# Patient Record
Sex: MALE | Race: OTHER | URBAN - METROPOLITAN AREA
[De-identification: names, ages, dates, MRNs, and addresses within clinical notes are randomized per-mention and may not be internally consistent; named-entity substitution may affect disease eponyms.]

---

## 2020-06-04 ENCOUNTER — INPATIENT (INPATIENT)
Facility: HOSPITAL | Age: 60
LOS: 0 days | Discharge: LEFT BEFORE DISCHARGE | End: 2020-06-05
Attending: INTERNAL MEDICINE | Admitting: INTERNAL MEDICINE
Payer: MEDICAID

## 2020-06-04 VITALS
RESPIRATION RATE: 20 BRPM | WEIGHT: 214.07 LBS | TEMPERATURE: 99 F | HEART RATE: 115 BPM | OXYGEN SATURATION: 97 % | DIASTOLIC BLOOD PRESSURE: 89 MMHG | SYSTOLIC BLOOD PRESSURE: 132 MMHG

## 2020-06-04 DIAGNOSIS — Z87.81 PERSONAL HISTORY OF (HEALED) TRAUMATIC FRACTURE: Chronic | ICD-10-CM

## 2020-06-04 DIAGNOSIS — S42.401A UNSPECIFIED FRACTURE OF LOWER END OF RIGHT HUMERUS, INITIAL ENCOUNTER FOR CLOSED FRACTURE: Chronic | ICD-10-CM

## 2020-06-04 DIAGNOSIS — Z98.890 OTHER SPECIFIED POSTPROCEDURAL STATES: Chronic | ICD-10-CM

## 2020-06-04 LAB
ALBUMIN SERPL ELPH-MCNC: 3.8 G/DL — SIGNIFICANT CHANGE UP (ref 3.5–5.2)
ALP SERPL-CCNC: 135 U/L — HIGH (ref 30–115)
ALT FLD-CCNC: 48 U/L — HIGH (ref 0–41)
ANION GAP SERPL CALC-SCNC: 14 MMOL/L — SIGNIFICANT CHANGE UP (ref 7–14)
APPEARANCE UR: CLEAR — SIGNIFICANT CHANGE UP
AST SERPL-CCNC: 45 U/L — HIGH (ref 0–41)
BASOPHILS # BLD AUTO: 0.05 K/UL — SIGNIFICANT CHANGE UP (ref 0–0.2)
BASOPHILS NFR BLD AUTO: 0.5 % — SIGNIFICANT CHANGE UP (ref 0–1)
BILIRUB SERPL-MCNC: 0.8 MG/DL — SIGNIFICANT CHANGE UP (ref 0.2–1.2)
BILIRUB UR-MCNC: NEGATIVE — SIGNIFICANT CHANGE UP
BUN SERPL-MCNC: 8 MG/DL — LOW (ref 10–20)
CALCIUM SERPL-MCNC: 8.8 MG/DL — SIGNIFICANT CHANGE UP (ref 8.5–10.1)
CHLORIDE SERPL-SCNC: 98 MMOL/L — SIGNIFICANT CHANGE UP (ref 98–110)
CO2 SERPL-SCNC: 25 MMOL/L — SIGNIFICANT CHANGE UP (ref 17–32)
COLOR SPEC: SIGNIFICANT CHANGE UP
CREAT SERPL-MCNC: 0.9 MG/DL — SIGNIFICANT CHANGE UP (ref 0.7–1.5)
DIFF PNL FLD: SIGNIFICANT CHANGE UP
EOSINOPHIL # BLD AUTO: 0.08 K/UL — SIGNIFICANT CHANGE UP (ref 0–0.7)
EOSINOPHIL NFR BLD AUTO: 0.8 % — SIGNIFICANT CHANGE UP (ref 0–8)
ERYTHROCYTE [SEDIMENTATION RATE] IN BLOOD: 45 MM/HR — HIGH (ref 0–10)
GLUCOSE SERPL-MCNC: 93 MG/DL — SIGNIFICANT CHANGE UP (ref 70–99)
GLUCOSE UR QL: NEGATIVE — SIGNIFICANT CHANGE UP
HCT VFR BLD CALC: 45 % — SIGNIFICANT CHANGE UP (ref 42–52)
HGB BLD-MCNC: 14.9 G/DL — SIGNIFICANT CHANGE UP (ref 14–18)
IMM GRANULOCYTES NFR BLD AUTO: 0.3 % — SIGNIFICANT CHANGE UP (ref 0.1–0.3)
KETONES UR-MCNC: ABNORMAL
LEUKOCYTE ESTERASE UR-ACNC: NEGATIVE — SIGNIFICANT CHANGE UP
LIDOCAIN IGE QN: 18 U/L — SIGNIFICANT CHANGE UP (ref 7–60)
LYMPHOCYTES # BLD AUTO: 1.85 K/UL — SIGNIFICANT CHANGE UP (ref 1.2–3.4)
LYMPHOCYTES # BLD AUTO: 19.5 % — LOW (ref 20.5–51.1)
MCHC RBC-ENTMCNC: 30.2 PG — SIGNIFICANT CHANGE UP (ref 27–31)
MCHC RBC-ENTMCNC: 33.1 G/DL — SIGNIFICANT CHANGE UP (ref 32–37)
MCV RBC AUTO: 91.1 FL — SIGNIFICANT CHANGE UP (ref 80–94)
MONOCYTES # BLD AUTO: 1.22 K/UL — HIGH (ref 0.1–0.6)
MONOCYTES NFR BLD AUTO: 12.8 % — HIGH (ref 1.7–9.3)
NEUTROPHILS # BLD AUTO: 6.28 K/UL — SIGNIFICANT CHANGE UP (ref 1.4–6.5)
NEUTROPHILS NFR BLD AUTO: 66.1 % — SIGNIFICANT CHANGE UP (ref 42.2–75.2)
NITRITE UR-MCNC: NEGATIVE — SIGNIFICANT CHANGE UP
NRBC # BLD: 0 /100 WBCS — SIGNIFICANT CHANGE UP (ref 0–0)
PH UR: 6.5 — SIGNIFICANT CHANGE UP (ref 5–8)
PLATELET # BLD AUTO: 333 K/UL — SIGNIFICANT CHANGE UP (ref 130–400)
POTASSIUM SERPL-MCNC: 5.7 MMOL/L — HIGH (ref 3.5–5)
POTASSIUM SERPL-SCNC: 5.7 MMOL/L — HIGH (ref 3.5–5)
PROT SERPL-MCNC: 7.5 G/DL — SIGNIFICANT CHANGE UP (ref 6–8)
PROT UR-MCNC: SIGNIFICANT CHANGE UP
RBC # BLD: 4.94 M/UL — SIGNIFICANT CHANGE UP (ref 4.7–6.1)
RBC # FLD: 11.7 % — SIGNIFICANT CHANGE UP (ref 11.5–14.5)
SODIUM SERPL-SCNC: 137 MMOL/L — SIGNIFICANT CHANGE UP (ref 135–146)
SP GR SPEC: >1.05 (ref 1.01–1.02)
TROPONIN T SERPL-MCNC: <0.01 NG/ML — SIGNIFICANT CHANGE UP
UROBILINOGEN FLD QL: SIGNIFICANT CHANGE UP
WBC # BLD: 9.51 K/UL — SIGNIFICANT CHANGE UP (ref 4.8–10.8)
WBC # FLD AUTO: 9.51 K/UL — SIGNIFICANT CHANGE UP (ref 4.8–10.8)

## 2020-06-04 PROCEDURE — 99223 1ST HOSP IP/OBS HIGH 75: CPT

## 2020-06-04 PROCEDURE — 99285 EMERGENCY DEPT VISIT HI MDM: CPT

## 2020-06-04 PROCEDURE — 93010 ELECTROCARDIOGRAM REPORT: CPT

## 2020-06-04 PROCEDURE — 74177 CT ABD & PELVIS W/CONTRAST: CPT | Mod: 26

## 2020-06-04 PROCEDURE — 99221 1ST HOSP IP/OBS SF/LOW 40: CPT

## 2020-06-04 PROCEDURE — 73080 X-RAY EXAM OF ELBOW: CPT | Mod: 26,RT

## 2020-06-04 RX ORDER — MORPHINE SULFATE 50 MG/1
4 CAPSULE, EXTENDED RELEASE ORAL ONCE
Refills: 0 | Status: DISCONTINUED | OUTPATIENT
Start: 2020-06-04 | End: 2020-06-04

## 2020-06-04 RX ORDER — ACETAMINOPHEN 500 MG
975 TABLET ORAL ONCE
Refills: 0 | Status: COMPLETED | OUTPATIENT
Start: 2020-06-04 | End: 2020-06-04

## 2020-06-04 RX ORDER — SODIUM CHLORIDE 9 MG/ML
1000 INJECTION, SOLUTION INTRAVENOUS ONCE
Refills: 0 | Status: COMPLETED | OUTPATIENT
Start: 2020-06-04 | End: 2020-06-04

## 2020-06-04 RX ORDER — OXYCODONE AND ACETAMINOPHEN 5; 325 MG/1; MG/1
1 TABLET ORAL EVERY 6 HOURS
Refills: 0 | Status: DISCONTINUED | OUTPATIENT
Start: 2020-06-04 | End: 2020-06-05

## 2020-06-04 RX ORDER — VANCOMYCIN HCL 1 G
1500 VIAL (EA) INTRAVENOUS ONCE
Refills: 0 | Status: COMPLETED | OUTPATIENT
Start: 2020-06-04 | End: 2020-06-04

## 2020-06-04 RX ORDER — IOHEXOL 300 MG/ML
30 INJECTION, SOLUTION INTRAVENOUS ONCE
Refills: 0 | Status: COMPLETED | OUTPATIENT
Start: 2020-06-04 | End: 2020-06-04

## 2020-06-04 RX ORDER — METHOCARBAMOL 500 MG/1
500 TABLET, FILM COATED ORAL ONCE
Refills: 0 | Status: COMPLETED | OUTPATIENT
Start: 2020-06-04 | End: 2020-06-04

## 2020-06-04 RX ORDER — IBUPROFEN 200 MG
400 TABLET ORAL EVERY 6 HOURS
Refills: 0 | Status: DISCONTINUED | OUTPATIENT
Start: 2020-06-04 | End: 2020-06-05

## 2020-06-04 RX ORDER — METHOCARBAMOL 500 MG/1
750 TABLET, FILM COATED ORAL EVERY 6 HOURS
Refills: 0 | Status: DISCONTINUED | OUTPATIENT
Start: 2020-06-04 | End: 2020-06-05

## 2020-06-04 RX ORDER — ACETAMINOPHEN 500 MG
650 TABLET ORAL EVERY 6 HOURS
Refills: 0 | Status: DISCONTINUED | OUTPATIENT
Start: 2020-06-04 | End: 2020-06-05

## 2020-06-04 RX ORDER — ENOXAPARIN SODIUM 100 MG/ML
40 INJECTION SUBCUTANEOUS DAILY
Refills: 0 | Status: DISCONTINUED | OUTPATIENT
Start: 2020-06-04 | End: 2020-06-05

## 2020-06-04 RX ORDER — METRONIDAZOLE 500 MG
500 TABLET ORAL ONCE
Refills: 0 | Status: COMPLETED | OUTPATIENT
Start: 2020-06-04 | End: 2020-06-04

## 2020-06-04 RX ORDER — GABAPENTIN 400 MG/1
0.5 CAPSULE ORAL
Qty: 0 | Refills: 0 | DISCHARGE

## 2020-06-04 RX ORDER — CEFEPIME 1 G/1
2000 INJECTION, POWDER, FOR SOLUTION INTRAMUSCULAR; INTRAVENOUS ONCE
Refills: 0 | Status: COMPLETED | OUTPATIENT
Start: 2020-06-04 | End: 2020-06-04

## 2020-06-04 RX ORDER — MORPHINE SULFATE 50 MG/1
2 CAPSULE, EXTENDED RELEASE ORAL ONCE
Refills: 0 | Status: DISCONTINUED | OUTPATIENT
Start: 2020-06-04 | End: 2020-06-04

## 2020-06-04 RX ORDER — CHLORHEXIDINE GLUCONATE 213 G/1000ML
1 SOLUTION TOPICAL
Refills: 0 | Status: DISCONTINUED | OUTPATIENT
Start: 2020-06-04 | End: 2020-06-05

## 2020-06-04 RX ORDER — SENNA PLUS 8.6 MG/1
2 TABLET ORAL AT BEDTIME
Refills: 0 | Status: DISCONTINUED | OUTPATIENT
Start: 2020-06-04 | End: 2020-06-05

## 2020-06-04 RX ADMIN — Medication 975 MILLIGRAM(S): at 13:45

## 2020-06-04 RX ADMIN — SODIUM CHLORIDE 1000 MILLILITER(S): 9 INJECTION, SOLUTION INTRAVENOUS at 15:34

## 2020-06-04 RX ADMIN — METHOCARBAMOL 500 MILLIGRAM(S): 500 TABLET, FILM COATED ORAL at 17:24

## 2020-06-04 RX ADMIN — Medication 500 MILLIGRAM(S): at 16:07

## 2020-06-04 RX ADMIN — IOHEXOL 30 MILLILITER(S): 300 INJECTION, SOLUTION INTRAVENOUS at 13:31

## 2020-06-04 RX ADMIN — MORPHINE SULFATE 4 MILLIGRAM(S): 50 CAPSULE, EXTENDED RELEASE ORAL at 15:21

## 2020-06-04 RX ADMIN — SENNA PLUS 2 TABLET(S): 8.6 TABLET ORAL at 22:23

## 2020-06-04 RX ADMIN — Medication 400 MILLIGRAM(S): at 22:25

## 2020-06-04 RX ADMIN — MORPHINE SULFATE 2 MILLIGRAM(S): 50 CAPSULE, EXTENDED RELEASE ORAL at 16:06

## 2020-06-04 RX ADMIN — CEFEPIME 2000 MILLIGRAM(S): 1 INJECTION, POWDER, FOR SOLUTION INTRAMUSCULAR; INTRAVENOUS at 16:02

## 2020-06-04 RX ADMIN — Medication 500 MILLIGRAM(S): at 15:34

## 2020-06-04 RX ADMIN — CEFEPIME 100 MILLIGRAM(S): 1 INJECTION, POWDER, FOR SOLUTION INTRAMUSCULAR; INTRAVENOUS at 15:36

## 2020-06-04 RX ADMIN — SODIUM CHLORIDE 1000 MILLILITER(S): 9 INJECTION, SOLUTION INTRAVENOUS at 13:31

## 2020-06-04 RX ADMIN — Medication 100 MILLIGRAM(S): at 15:14
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
## 2020-06-04 NOTE — H&P ADULT - NSHPPHYSICALEXAM_GEN_ALL_CORE
GENERAL: NAD, speaks in full sentences, no signs of respiratory distress  HEAD: Atraumatic  NECK: Supple  CHEST/LUNG: Clear to auscultation bilaterally; No wheeze or crackles  HEART: S1, S2; RRR; No murmurs, rubs, or gallops  ABDOMEN: BS+; Soft, Non-tender, Non-distended  EXTREMITIES:  2+ Peripheral Pulses, No clubbing, cyanosis, or edema. The right elbow lateral surgical scar is well healed, there is a chronic boggy synovitis noted   no cellulitis or skin break down, rom 50 to 100  degree.  PSYCH: AAOx3  NEUROLOGY: non-focal

## 2020-06-04 NOTE — H&P ADULT - NSHPLABSRESULTS_GEN_ALL_CORE
14.9   9.51  )-----------( 333      ( 04 Jun 2020 13:04 )             45.0       06-04    137  |  98  |  8<L>  ----------------------------<  93  5.7<H>   |  25  |  0.9    Ca    8.8      04 Jun 2020 13:04    TPro  7.5  /  Alb  3.8  /  TBili  0.8  /  DBili  x   /  AST  45<H>  /  ALT  48<H>  /  AlkPhos  135<H>  06-04      RADIOLOGY:  < from: CT Abdomen and Pelvis w/ Oral Cont and w/ IV Cont (06.04.20 @ 16:35) >      IMPRESSION:     1. No evidence of intra-abdominal or pelvic inflammatory process    2. Degenerative changes of the spine are noted with disc space narrowing at T11-T12, and with degenerative change and vacuum phenomenon at L4-5 and L5-S1. There is mild anterior wedging of the vertebral body of T11 of indeterminate age.    3. There are deformities of the right iliac bone and of the visualized right femur compatible with posttraumatic and/or postsurgical changes (such as previous right intramedullary kiya).      < end of copied text >    < from: Xray Elbow AP + Lateral + Oblique, Right (06.04.20 @ 13:32) >    INTERPRETATION:  Clinical History / Reason for exam: Elbow pain.    3 views of the right elbow. No comparison available.    Findings: Patient is post right elbow pinning. No evidence of acute fracture or dislocation. There are bony productive changes. There is no evidence of radiopaque foreign body.    Impression: No evidence of acute fracture or dislocation. If pain persists, follow-up radiograph or cross-sectional imaging may be helpful for further evaluation.    < end of copied text >
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
## 2020-06-04 NOTE — ED PROVIDER NOTE - CARE PLAN
Principal Discharge DX:	Back pain  Secondary Diagnosis:	Elbow pain Principal Discharge DX:	Back pain  Secondary Diagnosis:	Elbow pain  Secondary Diagnosis:	Constipation
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
## 2020-06-04 NOTE — ED PROVIDER NOTE - PHYSICAL EXAMINATION
CONSTITUTIONAL: Well-developed; well-nourished; in no acute distress.   SKIN: warm, dry  HEAD: Normocephalic.  EYES: PERRL, EOMI.  ENT: Airway clear.  NECK: Supple.  LYMPH: No acute cervical adenopathy.  CARD: No murmurs, rubs or gallops. Regular rate and rhythm.   RESP: No wheezing, rales or rhonchi.  ABD: soft ntnd, no peritoneal signs, no CVA tenderness.  EXT: No clubbing, cyanosis.   NEURO: Alert, oriented.  PSYCH: Cooperative, appropriate.  : Good rectal tone CONSTITUTIONAL: Well-developed; well-nourished; in no acute distress.   SKIN: warm, dry  HEAD: Normocephalic.  EYES: PERRL, EOMI.  ENT: Airway clear.  NECK: Supple.  LYMPH: No acute cervical adenopathy.  CARD: No murmurs, rubs or gallops. Regular rate and rhythm.   RESP: No wheezing, rales or rhonchi.  ABD: soft ntnd, no peritoneal signs, no CVA tenderness.  EXT: No clubbing, cyanosis. Unable to fully extend R elbow. No overlying heat or rash.  NEURO: Alert, oriented. sensation intact to LE bilaterally. TTP over lumbosacral junction, no point tenderness or stepoffs.  PSYCH: Cooperative, appropriate.  : Good rectal tone, no blood.

## 2020-06-04 NOTE — ED ADULT NURSE REASSESSMENT NOTE - NS ED NURSE REASSESS COMMENT FT1
Received patient in NAD A&OX4 admitted to medicine for back pain, constipation and arm pain. Patient states has not moved his bowels in a few day. Patient in no respiratory distress. Will continue to monitor .

## 2020-06-04 NOTE — ED ADULT NURSE REASSESSMENT NOTE - NS ED NURSE REASSESS COMMENT FT1
Patient reassessed, complains of right arm pain but lower back pain is relieved, MD notified and made aware, will continue to watch and assess patient, safety and comfort measures maintained.

## 2020-06-04 NOTE — ED ADULT TRIAGE NOTE - CHIEF COMPLAINT QUOTE
pt came to ED c/o right arm pain. also states he had a bloody bowel movement in Saturday and has not moved his bowels since then. states he had lower back pain that radiates to the top. denies fever. denies cough. denies N/V. denies chest pain

## 2020-06-05 VITALS
TEMPERATURE: 98 F | OXYGEN SATURATION: 99 % | RESPIRATION RATE: 20 BRPM | HEART RATE: 76 BPM | SYSTOLIC BLOOD PRESSURE: 139 MMHG | DIASTOLIC BLOOD PRESSURE: 73 MMHG

## 2020-06-05 LAB
ALBUMIN SERPL ELPH-MCNC: 3.2 G/DL — LOW (ref 3.5–5.2)
ALP SERPL-CCNC: 115 U/L — SIGNIFICANT CHANGE UP (ref 30–115)
ALT FLD-CCNC: 43 U/L — HIGH (ref 0–41)
ANION GAP SERPL CALC-SCNC: 12 MMOL/L — SIGNIFICANT CHANGE UP (ref 7–14)
AST SERPL-CCNC: 35 U/L — SIGNIFICANT CHANGE UP (ref 0–41)
BASOPHILS # BLD AUTO: 0.04 K/UL — SIGNIFICANT CHANGE UP (ref 0–0.2)
BASOPHILS NFR BLD AUTO: 0.5 % — SIGNIFICANT CHANGE UP (ref 0–1)
BILIRUB SERPL-MCNC: 0.7 MG/DL — SIGNIFICANT CHANGE UP (ref 0.2–1.2)
BUN SERPL-MCNC: 7 MG/DL — LOW (ref 10–20)
CALCIUM SERPL-MCNC: 8.3 MG/DL — LOW (ref 8.5–10.1)
CHLORIDE SERPL-SCNC: 101 MMOL/L — SIGNIFICANT CHANGE UP (ref 98–110)
CO2 SERPL-SCNC: 26 MMOL/L — SIGNIFICANT CHANGE UP (ref 17–32)
CREAT SERPL-MCNC: 0.8 MG/DL — SIGNIFICANT CHANGE UP (ref 0.7–1.5)
CRP SERPL-MCNC: 8.33 MG/DL — HIGH (ref 0–0.4)
CULTURE RESULTS: SIGNIFICANT CHANGE UP
EOSINOPHIL # BLD AUTO: 0.13 K/UL — SIGNIFICANT CHANGE UP (ref 0–0.7)
EOSINOPHIL NFR BLD AUTO: 1.7 % — SIGNIFICANT CHANGE UP (ref 0–8)
GLUCOSE SERPL-MCNC: 102 MG/DL — HIGH (ref 70–99)
HCT VFR BLD CALC: 38.6 % — LOW (ref 42–52)
HCV AB S/CO SERPL IA: 0.04 COI — SIGNIFICANT CHANGE UP
HCV AB SERPL-IMP: SIGNIFICANT CHANGE UP
HGB BLD-MCNC: 13.1 G/DL — LOW (ref 14–18)
IMM GRANULOCYTES NFR BLD AUTO: 0.3 % — SIGNIFICANT CHANGE UP (ref 0.1–0.3)
LYMPHOCYTES # BLD AUTO: 2.18 K/UL — SIGNIFICANT CHANGE UP (ref 1.2–3.4)
LYMPHOCYTES # BLD AUTO: 27.9 % — SIGNIFICANT CHANGE UP (ref 20.5–51.1)
MAGNESIUM SERPL-MCNC: 1.9 MG/DL — SIGNIFICANT CHANGE UP (ref 1.8–2.4)
MCHC RBC-ENTMCNC: 31.3 PG — HIGH (ref 27–31)
MCHC RBC-ENTMCNC: 33.9 G/DL — SIGNIFICANT CHANGE UP (ref 32–37)
MCV RBC AUTO: 92.1 FL — SIGNIFICANT CHANGE UP (ref 80–94)
MONOCYTES # BLD AUTO: 1.24 K/UL — HIGH (ref 0.1–0.6)
MONOCYTES NFR BLD AUTO: 15.9 % — HIGH (ref 1.7–9.3)
NEUTROPHILS # BLD AUTO: 4.19 K/UL — SIGNIFICANT CHANGE UP (ref 1.4–6.5)
NEUTROPHILS NFR BLD AUTO: 53.7 % — SIGNIFICANT CHANGE UP (ref 42.2–75.2)
NRBC # BLD: 0 /100 WBCS — SIGNIFICANT CHANGE UP (ref 0–0)
PLATELET # BLD AUTO: 275 K/UL — SIGNIFICANT CHANGE UP (ref 130–400)
POTASSIUM SERPL-MCNC: 3.7 MMOL/L — SIGNIFICANT CHANGE UP (ref 3.5–5)
POTASSIUM SERPL-SCNC: 3.7 MMOL/L — SIGNIFICANT CHANGE UP (ref 3.5–5)
PROT SERPL-MCNC: 5.8 G/DL — LOW (ref 6–8)
RBC # BLD: 4.19 M/UL — LOW (ref 4.7–6.1)
RBC # FLD: 11.7 % — SIGNIFICANT CHANGE UP (ref 11.5–14.5)
SARS-COV-2 RNA SPEC QL NAA+PROBE: SIGNIFICANT CHANGE UP
SODIUM SERPL-SCNC: 139 MMOL/L — SIGNIFICANT CHANGE UP (ref 135–146)
SPECIMEN SOURCE: SIGNIFICANT CHANGE UP
WBC # BLD: 7.8 K/UL — SIGNIFICANT CHANGE UP (ref 4.8–10.8)
WBC # FLD AUTO: 7.8 K/UL — SIGNIFICANT CHANGE UP (ref 4.8–10.8)

## 2020-06-05 PROCEDURE — 99232 SBSQ HOSP IP/OBS MODERATE 35: CPT

## 2020-06-05 RX ADMIN — OXYCODONE AND ACETAMINOPHEN 1 TABLET(S): 5; 325 TABLET ORAL at 12:11

## 2020-06-05 RX ADMIN — METHOCARBAMOL 750 MILLIGRAM(S): 500 TABLET, FILM COATED ORAL at 05:16

## 2020-06-05 RX ADMIN — METHOCARBAMOL 750 MILLIGRAM(S): 500 TABLET, FILM COATED ORAL at 12:11

## 2020-06-05 RX ADMIN — METHOCARBAMOL 750 MILLIGRAM(S): 500 TABLET, FILM COATED ORAL at 00:19

## 2020-06-05 RX ADMIN — OXYCODONE AND ACETAMINOPHEN 1 TABLET(S): 5; 325 TABLET ORAL at 00:19

## 2020-06-05 NOTE — PROGRESS NOTE ADULT - SUBJECTIVE AND OBJECTIVE BOX
pt seen and examined in am  complaining of right elbow pain and right side rib pain, no gib, no constipation   denies any recent trauma  was seen by ortho   Vital Signs Last 24 Hrs  T(C): 36.9 (05 Jun 2020 08:11), Max: 37.9 (04 Jun 2020 19:11)  T(F): 98.4 (05 Jun 2020 08:11), Max: 100.2 (04 Jun 2020 19:11)  HR: 76 (05 Jun 2020 08:11) (76 - 105)  BP: 139/73 (05 Jun 2020 08:11) (125/83 - 139/73)  BP(mean): --  RR: 20 (05 Jun 2020 08:11) (18 - 20)  SpO2: 99% (05 Jun 2020 08:11) (95% - 100%)  Physical exam:   constitutional NAD, AAOX3, Respiratory  lungs CTA, CVS heart RRR, GI: abdomen Soft NT, ND, BS+, skin: intact  neuro exam non focal. tenderness on the right elbow and right lower ribs in the back, no redness, no wounds, old scar ( old surgery on the rt elbow)                         13.1   7.80  )-----------( 275      ( 05 Jun 2020 05:47 )             38.6   06-05    139  |  101  |  7<L>  ----------------------------<  102<H>  3.7   |  26  |  0.8    Ca    8.3<L>      05 Jun 2020 05:47  Mg     1.9     06-05    TPro  5.8<L>  /  Alb  3.2<L>  /  TBili  0.7  /  DBili  x   /  AST  35  /  ALT  43<H>  /  AlkPhos  115  06-05    < from: Xray Elbow AP + Lateral + Oblique, Right (06.04.20 @ 13:32) >    Impression: No evidence of acute fracture or dislocation. If pain persists, follow-up radiograph or cross-sectional imaging may be helpful for further evaluation.    < end of copied text >  < from: CT Abdomen and Pelvis w/ Oral Cont and w/ IV Cont (06.04.20 @ 16:35) >    1. No evidence of intra-abdominal or pelvic inflammatory process    2. Degenerative changes of the spine are noted with disc space narrowing at T11-T12, and with degenerative change and vacuum phenomenon at L4-5 and L5-S1. There is mild anterior wedging of the vertebral body of T11 of indeterminate age.    < end of copied text >  a/p  chronic pain on elbow and ribs, check rib series ( ordered )   Istop checked pt is on lyrica and indocet which we ordered.   pt did not want to stay to get these meds  also pain management ordered  pt signed ama,   pt is AAOx3, he understands risks and benefits of his hospital stay.   no distress,   stable  ambulating,

## 2020-06-05 NOTE — CONSULT NOTE ADULT - SUBJECTIVE AND OBJECTIVE BOX
Patient Name: Markell Nagel Date: 1960  Address: [address] [place] [id number]: Male  Rx Written	Rx Dispensed	Drug	Quantity	Days Supply	Prescriber Name  05/04/2020	05/12/2020	endocet  mg tablet	75	30	Akuamoah, Domonique  Payment Method Insurance  Dispenser [facility]  05/04/2020	05/12/2020	pregabalin 75 mg capsule	90	30	Akuamoah, Domonique  Payment Method Insurance  Dispenser [facility]  04/08/2020	04/13/2020	endocet  mg tablet	75	30	Akuamoah, Domonique  Payment Method Insurance  Dispenser [facility]  04/08/2020	04/13/2020	pregabalin 75 mg capsule	90	30	Akuamoah, Domonique  Payment Method Insurance  Dispenser [facility]  03/13/2020	03/16/2020	pregabalin 75 mg capsule	90	30	Akuamoah, Domonique  Payment Method Insurance  Dispenser [facility]  03/13/2020	03/14/2020	endocet  mg tablet	75	30	Akuamoah, Domonique  Payment Method Insurance  Dispenser [facility]  02/14/2020	02/20/2020	pregabalin 75 mg capsule	90	30	Damora, Cami  Payment Method Insurance  Dispenser [facility]  02/14/2020	02/15/2020	endocet  mg tablet	75	30	Damora, Cami  Payment Method Insurance  Dispenser [facility]  01/17/2020	01/18/2020	endocet  mg tablet	75	30	Damora, Cami  Payment Method Insurance  Dispenser [facility] Arrived to ED at this time to see patient for pain consult. Patient has left the ED as AMA.         Patient Name: Markell Nagel Date: 1960  Address: [address] [place] [id number]: Male  Rx Written	Rx Dispensed	Drug	Quantity	Days Supply	Prescriber Name  05/04/2020	05/12/2020	endocet  mg tablet	75	30	Akuamoah, Domonique  Payment Method Insurance  Dispenser [facility]  05/04/2020	05/12/2020	pregabalin 75 mg capsule	90	30	Akuamoah, Domonique  [facility]  04/08/2020	04/13/2020	endocet  mg tablet	75	30	Akuamoah, Domonique  Payment Method Insurance  [facility]  04/08/2020	04/13/2020	pregabalin 75 mg capsule	90	30	Akuamoah, Domonique  Payment Method Insurance  [facility]  03/13/2020	03/16/2020	pregabalin 75 mg capsule	90	30	Akuamoah, Domonique  Payment Method Insurance  Disp[facility]er [facility]  03/13/2020	03/14/2020	endocet  mg tablet	75	30	Akuamoah, Domonique  Payment Method Insurance  [facility]  02/14/2020	02/20/2020	pregabalin 75 mg capsule	90	30	Damora, Cami  Payment Method Insurance  Dispenser [facility]  02/14/2020	02/15/2020	endocet  mg tablet	75	30	Damora, Cami  Payment Method Insurance  Dispenser [facility]  01/17/2020	01/18/2020	endocet  mg tablet	75	30	Damora, Cami  Payment [facility] Insurance  [facility]

## 2020-06-05 NOTE — CHART NOTE - NSCHARTNOTEFT_GEN_A_CORE
Patient has left the hospital, reporting to the RN he "does not want to wait any longer."  There was suspicion that the patient is pain seeking, with out-of-proportion pain on exam.  We were going to consult Pain Management and order additional pain medication for better control (Percocet 5/325mg PO Q6H PRN and his home dose of Lyrica)  I was unable to speak with the patient before his leaving, and he was not provided with discharge recommendations.     To notify attending physician.

## 2020-06-08 DIAGNOSIS — M65.88 OTHER SYNOVITIS AND TENOSYNOVITIS, OTHER SITE: ICD-10-CM

## 2020-06-08 DIAGNOSIS — K62.5 HEMORRHAGE OF ANUS AND RECTUM: ICD-10-CM

## 2020-06-08 DIAGNOSIS — K59.00 CONSTIPATION, UNSPECIFIED: ICD-10-CM

## 2020-06-08 DIAGNOSIS — M54.5 LOW BACK PAIN: ICD-10-CM

## 2020-06-08 DIAGNOSIS — R07.81 PLEURODYNIA: ICD-10-CM

## 2020-06-08 DIAGNOSIS — M25.521 PAIN IN RIGHT ELBOW: ICD-10-CM

## 2020-06-08 DIAGNOSIS — Z98.890 OTHER SPECIFIED POSTPROCEDURAL STATES: ICD-10-CM

## 2020-06-08 DIAGNOSIS — G89.29 OTHER CHRONIC PAIN: ICD-10-CM

## 2020-06-08 DIAGNOSIS — M19.021 PRIMARY OSTEOARTHRITIS, RIGHT ELBOW: ICD-10-CM

## 2020-06-08 DIAGNOSIS — Z87.828 PERSONAL HISTORY OF OTHER (HEALED) PHYSICAL INJURY AND TRAUMA: ICD-10-CM

## 2020-06-09 LAB
CULTURE RESULTS: SIGNIFICANT CHANGE UP
CULTURE RESULTS: SIGNIFICANT CHANGE UP
SPECIMEN SOURCE: SIGNIFICANT CHANGE UP
SPECIMEN SOURCE: SIGNIFICANT CHANGE UP

## 2020-08-05 ENCOUNTER — OUTPATIENT (OUTPATIENT)
Dept: OUTPATIENT SERVICES | Facility: HOSPITAL | Age: 60
LOS: 1 days | Discharge: HOME | End: 2020-08-05
Payer: MEDICAID

## 2020-08-05 DIAGNOSIS — Z87.81 PERSONAL HISTORY OF (HEALED) TRAUMATIC FRACTURE: Chronic | ICD-10-CM

## 2020-08-05 DIAGNOSIS — M25.559 PAIN IN UNSPECIFIED HIP: ICD-10-CM

## 2020-08-05 DIAGNOSIS — M25.521 PAIN IN RIGHT ELBOW: ICD-10-CM

## 2020-08-05 DIAGNOSIS — Z98.890 OTHER SPECIFIED POSTPROCEDURAL STATES: Chronic | ICD-10-CM

## 2020-08-05 DIAGNOSIS — S42.401A UNSPECIFIED FRACTURE OF LOWER END OF RIGHT HUMERUS, INITIAL ENCOUNTER FOR CLOSED FRACTURE: Chronic | ICD-10-CM

## 2020-08-05 PROBLEM — K52.9 NONINFECTIVE GASTROENTERITIS AND COLITIS, UNSPECIFIED: Chronic | Status: ACTIVE | Noted: 2020-06-04

## 2020-08-05 PROCEDURE — 73522 X-RAY EXAM HIPS BI 3-4 VIEWS: CPT | Mod: 26

## 2020-08-05 PROCEDURE — 73080 X-RAY EXAM OF ELBOW: CPT | Mod: 26,RT

## 2021-09-21 NOTE — ED PROVIDER NOTE - PMH
Detail Level: Zone
Sunscreen Recommendations: Patient instructed to RTO in 3 months for FBSE
Detail Level: Detailed
Colitis

## 2022-07-15 PROBLEM — Z00.00 ENCOUNTER FOR PREVENTIVE HEALTH EXAMINATION: Status: ACTIVE | Noted: 2022-07-15

## 2022-08-26 ENCOUNTER — APPOINTMENT (OUTPATIENT)
Dept: GASTROENTEROLOGY | Facility: CLINIC | Age: 62
End: 2022-08-26
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
## 2023-01-10 ENCOUNTER — APPOINTMENT (OUTPATIENT)
Dept: PAIN MANAGEMENT | Facility: CLINIC | Age: 63
End: 2023-01-10

## 2023-06-06 ENCOUNTER — LABORATORY RESULT (OUTPATIENT)
Age: 63
End: 2023-06-06

## 2023-06-06 ENCOUNTER — APPOINTMENT (OUTPATIENT)
Dept: PAIN MANAGEMENT | Facility: CLINIC | Age: 63
End: 2023-06-06
Payer: MEDICAID

## 2023-06-06 VITALS
BODY MASS INDEX: 29.82 KG/M2 | SYSTOLIC BLOOD PRESSURE: 131 MMHG | DIASTOLIC BLOOD PRESSURE: 87 MMHG | WEIGHT: 190 LBS | HEIGHT: 67 IN | HEART RATE: 75 BPM

## 2023-06-06 LAB
AMP / AMPHETAMINE: NEGATIVE
BAR / SECOBARBITAL: NEGATIVE
BUP / BUPRENORPHINE: NEGATIVE
BZO / OXAZEPAM: NEGATIVE
COC / COCAINE: NEGATIVE
CREATININE: 50 MG/DL
MDMA / METHYLENEDIOXYMETHAMPHETAMINE: NEGATIVE
MET / METHAMPHETAMINES: NEGATIVE
MOP / MORPHINE: NEGATIVE
MTD / METHADONE: NEGATIVE
OXY / OXYCODONE: POSITIVE
PCP / PHENCYCLIDINE: NEGATIVE
PH: 5
SPECIFIC GRAVITY: 1.02
TEMPERATURE: 90 C
THC / MARIJUANA: NEGATIVE

## 2023-06-06 PROCEDURE — 80305 DRUG TEST PRSMV DIR OPT OBS: CPT | Mod: QW

## 2023-06-06 PROCEDURE — 99204 OFFICE O/P NEW MOD 45 MIN: CPT
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
## 2023-06-06 NOTE — PHYSICAL EXAM
[de-identified] : GENERAL APPEARANCE OF PATIENT IS WELL DEVELOPED, WELL NOURISHED, BODY HABITUS NORMAL, WELL GROOMED, NO DEFORMITIES NOTED. \par Head - Atraumatic and Normocephalic \par Eyes, Nose, and Throat: External inspection of ears and nose are normal overall without scars, lesions, or masses noted. Assessment of hearing is normal\par Neck-Examination of neck shows no masses, overall appearance is normal, neck is symmetric, tracheal position is midline, no crepitus is noted. Examination of thyroid shows no enlargement, tenderness or masses\par Respiratory- Assessment of respiratory effort shows no intercostal retractions, no use of accessory muscles, unlabored breathing, and normal diaphragmatic movement.\par Cardiovascular- Examination of extremities show no edema or varicosities\par Musculoskeletal. Examination of gait is not antalgic and station is normal\par Inspection and palpation of digits and nails shows no clubbing, cyanosis, nodules, drainage, fluctuance, petechiae\par \par • Spine – inspection and palpation shows no misalignment, asymmetry, crepitation, defects, tenderness, masses, effusions. ROM is normal without crepitation or contracture. No instability or subluxation or laxity is noted. No abnormal movements.\par \par \par • Neck- inspection and palpation shows no misalignment, asymmetry, crepitation, defects, tenderness, masses, effusions. ROM is normal without crepitation or contracture. No instability or subluxation or laxity is noted. No abnormal movements.\par \par \par • RUE- limited range of motion in extension of the elbow\par \par \par • LUE- inspection and palpation shows no misalignment, asymmetry, crepitation, defects, tenderness, masses, effusions. ROM is normal without crepitation or contracture. No instability or subluxation or laxity is noted. No abnormal movements.\par \par \par • RLE- greater trochanter tenderness. Right groin pain. Diffuse tenderness throughout right knee. Crepitus in the right knee. \par \par \par • LLE- inspection and palpation shows no misalignment, asymmetry, crepitation, defects, tenderness, masses, effusions. ROM is normal without crepitation or contracture. No instability or subluxation or laxity is noted. No abnormal movements.\par \par \par • Skin- Inspection of skin and subcutaneous tissue shows no rashes, lesions or ulcers. Palpation of skin and subcutaneous tissue shows no rashes, no indurations, subcutaneous nodules or tightening.\par \par \par • Abdomen- Nontender\par \par \par • Neurologic- CN 2-12 are grossly intact. No sensory or motor deficits in the upper and lower extremities. Adequate strength in upper and lower extremities \par \par \par • Psychiatric- Patient’s judgment and insight are intact. Oriented to time, place and person. Recent and remote memory intact.\par

## 2023-06-09 LAB

## 2023-06-20 ENCOUNTER — APPOINTMENT (OUTPATIENT)
Dept: PAIN MANAGEMENT | Facility: CLINIC | Age: 63
End: 2023-06-20
Payer: MEDICAID

## 2023-06-20 PROCEDURE — 94761 N-INVAS EAR/PLS OXIMETRY MLT: CPT

## 2023-06-20 PROCEDURE — 77002 NEEDLE LOCALIZATION BY XRAY: CPT | Mod: 79

## 2023-06-20 PROCEDURE — 93040 RHYTHM ECG WITH REPORT: CPT | Mod: 79

## 2023-06-20 PROCEDURE — 64450 NJX AA&/STRD OTHER PN/BRANCH: CPT | Mod: RT

## 2023-06-20 PROCEDURE — 93770 DETERMINATION VENOUS PRESS: CPT

## 2023-06-20 NOTE — PROCEDURE
[FreeTextEntry1] : DIAGNOSTIC THE SUPERIOR MEDIAL GENICULAR, SUPERIOR LATERAL GENICULAR AND INFERIOR MEDIAL GENICULAR NERVE INJECTION UNDER FLURO GUIDANCE \par   [FreeTextEntry3] : DIAGNOSTIC THE SUPERIOR MEDIAL GENICULAR, SUPERIOR LATERAL GENICULAR AND INFERIOR MEDIAL GENICULAR NERVE INJECTION UNDER FLURO GUIDANCE \par  \par Pre-op Diagnosis:Right Knee pain, knee osteoarthritis and knee arthropathy.\par Post-op Diagnosis: Same\par Procedure: Diagnostic right Superior medial genicular, superior lateral genicular and inferior medial genicular nerve injection with fluro guidance of the right knee. \par Anesthesia: See nurses note/Local/cold spray.\par Physician: Michel BUCK\par \par Medical necessity: Failure of conservative medical management\par \par CONSENT: The possible complications including infection, bleeding, nerve damage, death or failure of the procedure; though unusual, are theoretically possible. The patient was educated about the of the procedure and alternative therapies. All questions were answered and the patient freely gave consent to proceed.\par  \par Monitoring: Patient had continuous blood pressure, EKG, and pulse oximetry throughout the case. See nurse's notes. \par  \par Procedure Note: \par After obtaining written consent, the patient was placed in a supine position. The patient's knee was then placed on pillows as tolerated to offset a clear lateral view of each leg. The skin of the target knee was prepped with a chloraprep solution and drapped.sticks. On the affected knee the superior medial and lateral epicondyle of the femur as well as the distal aspect of the medial tibial epicondyle was identified as the target zone for the injection using an AP view. Each of these regions was marked and the skin and subcutaneous tissue of these regions were infiltrated with cold spray than several mLs of local anesthesia. The needles were advanced using fluoroscopic guidance until reaching the mid level of the femur and tibia confirmed with a lateral view. Again, after negative aspiration for air or heme, 1-2 mL of a solution containing 2% lidocaine local was injected at each of the 3 sites. Needle was removed intact. The patient tolerated the procedure well. \par  \par Complications: none\par  \par Disposition: I have examined the patient and there are no new physical findings since the original presentation. Sensory and motor function were intact. The patient met discharge criteria see nurses notes. The patient was discharged home with a . The discharge instruction sheet was given to the patient\par  \par Comment: Patient had 100% immediate relief. If patient has a diagnostic genicular injections with 70% relief greater than 2 hours or 50% greater than 24 hours would be candidate for RFA. Follow in office. Call if any problems.  \par  \par Indication for RFA: The pt had a positive response to the genicular injections and is considered a good candidate for the thermal RF ablation procedure. The diagnostic injection provided at least 75% reduction in pain for 1 hour or 50% relief for 24 hours and the following criteria have been met. Failed response physical therapy, steroid injection, hyaline supplementation injection, not surgical candidate or does not what to proceed with surgery. \par \par Michel Vasquez, D.O. \par Diplomat, American Board of Anesthesiology\par Diplomat, American Board of Pain Medicine\par Diplomat, American Board of Pain Management\par

## 2023-06-27 ENCOUNTER — APPOINTMENT (OUTPATIENT)
Dept: PAIN MANAGEMENT | Facility: CLINIC | Age: 63
End: 2023-06-27
Payer: MEDICAID

## 2023-06-27 VITALS
SYSTOLIC BLOOD PRESSURE: 137 MMHG | HEIGHT: 67 IN | DIASTOLIC BLOOD PRESSURE: 84 MMHG | BODY MASS INDEX: 31.39 KG/M2 | WEIGHT: 200 LBS | HEART RATE: 89 BPM

## 2023-06-27 PROCEDURE — 99213 OFFICE O/P EST LOW 20 MIN: CPT
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
## 2023-06-27 NOTE — PHYSICAL EXAM
[de-identified] : GENERAL APPEARANCE OF PATIENT IS WELL DEVELOPED, WELL NOURISHED, BODY HABITUS NORMAL, WELL GROOMED, NO DEFORMITIES NOTED. \par Head - Atraumatic and Normocephalic \par Eyes, Nose, and Throat: External inspection of ears and nose are normal overall without scars, lesions, or masses noted. Assessment of hearing is normal\par Neck-Examination of neck shows no masses, overall appearance is normal, neck is symmetric, tracheal position is midline, no crepitus is noted. Examination of thyroid shows no enlargement, tenderness or masses\par Respiratory- Assessment of respiratory effort shows no intercostal retractions, no use of accessory muscles, unlabored breathing, and normal diaphragmatic movement.\par Cardiovascular- Examination of extremities show no edema or varicosities\par Musculoskeletal. Examination of gait is not antalgic and station is normal\par Inspection and palpation of digits and nails shows no clubbing, cyanosis, nodules, drainage, fluctuance, petechiae\par \par • Spine – inspection and palpation shows no misalignment, asymmetry, crepitation, defects, tenderness, masses, effusions. ROM is normal without crepitation or contracture. No instability or subluxation or laxity is noted. No abnormal movements.\par \par \par • Neck- inspection and palpation shows no misalignment, asymmetry, crepitation, defects, tenderness, masses, effusions. ROM is normal without crepitation or contracture. No instability or subluxation or laxity is noted. No abnormal movements.\par \par \par • RUE- limited range of motion in extension of the elbow\par \par \par • LUE- inspection and palpation shows no misalignment, asymmetry, crepitation, defects, tenderness, masses, effusions. ROM is normal without crepitation or contracture. No instability or subluxation or laxity is noted. No abnormal movements.\par \par \par • RLE- greater trochanter tenderness. Right groin pain. Diffuse tenderness throughout right knee. Crepitus in the right knee. \par \par \par • LLE- inspection and palpation shows no misalignment, asymmetry, crepitation, defects, tenderness, masses, effusions. ROM is normal without crepitation or contracture. No instability or subluxation or laxity is noted. No abnormal movements.\par \par \par • Skin- Inspection of skin and subcutaneous tissue shows no rashes, lesions or ulcers. Palpation of skin and subcutaneous tissue shows no rashes, no indurations, subcutaneous nodules or tightening.\par \par \par • Abdomen- Nontender\par \par \par • Neurologic- CN 2-12 are grossly intact. No sensory or motor deficits in the upper and lower extremities. Adequate strength in upper and lower extremities \par \par \par • Psychiatric- Patient’s judgment and insight are intact. Oriented to time, place and person. Recent and remote memory intact.\par

## 2023-06-27 NOTE — ASSESSMENT
[FreeTextEntry1] : Mr. Markell Echeverria is a 62 year old male with a chief complaint of right knee and right hip pain. He has undergone a right hip replacement, however he is still having pain. He has also had a right knee surgery with no relief of his pain. He has also undergone right knee steroid injections with no relief. He was previously under the care of Dr. Leigh who prescribed him Endocet, gabapentin and naproxen. He notes the combination of the medications takes almost 100% of his pain away for the majority of the day. He notes his doctor had wanted him to undergo injections in the lower back which the patient did not agree with, as he does not have lower back pain. \par \par At this time, I have agreed to continue his medication management in the form of Endocet  mg, Gabapentin, and naproxen. We will refer the patient to the neuropsychologist to determine if he is a candidate for chronic narcotic medications vs. an intrathecal pain pump. Overall there is at least a 30-50% reduction in pain with the prescribed analgesics. The patient denies any adverse side effects due to the medication (sleeping disturbance, constipation, sleepiness, hallucinations and/or urination problems). \par \par In regard to his hip pain, patient is scheduled for a right greater trochanteric bursa injection with fluoroscopy guidance to better visualize the joint. If ongoing relief of greater than 30% for 4 months can be repeated in 4-6 months VS PRP or PDA or Stem Cells. If no relief we would trial a right hip articular injection. \par \par In regard to his knee pain, he is status post right knee diagnostic genicular injection on 6/20/23. He notes this procedure provided him with over 50% relief for 2 days. This is therefore diagnostic of his pain. We will proceed with a right knee genicular RFA. \par \par Risk, benefits, pros and cons of procedure were explained to the patient using models and diagrams and their questions were answered.  \par \par The patient has severe anxiety of procedures that necessitates monitored anesthesia care (MAC). The procedure performed will be close to major nerves, arteries, and spinal cord and/or joint structures. Due to the proximity of these structures, we need the patient to be still during the procedure.  With the help of MAC, this will be safely achieved and decrease the risk of any complications.\par \par UDS completed 6/6/23- +OXY\par \par I, Rosalie Ralph, attest that this documentation has been prepared under the direction and in the presence of Provider Michel Vasquez DO\dru The documentation recorded by the scribe, in my presence, accurately reflects the service I personally performed, and the decisions made by me with my edits as appropriate.\par \par Best Regards, \par Michel Vasquez, D.O. \par Diplomat, American Board of Anesthesiology \par Diplomat, American Board of Pain Medicine \par Diplomat, American Board of Pain Management

## 2023-07-11 ENCOUNTER — APPOINTMENT (OUTPATIENT)
Dept: PAIN MANAGEMENT | Facility: CLINIC | Age: 63
End: 2023-07-11

## 2023-07-17 ENCOUNTER — OUTPATIENT (OUTPATIENT)
Dept: OUTPATIENT SERVICES | Facility: HOSPITAL | Age: 63
LOS: 1 days | End: 2023-07-17
Payer: MEDICAID

## 2023-07-17 DIAGNOSIS — K05.6 PERIODONTAL DISEASE, UNSPECIFIED: ICD-10-CM

## 2023-07-17 DIAGNOSIS — Z87.81 PERSONAL HISTORY OF (HEALED) TRAUMATIC FRACTURE: Chronic | ICD-10-CM

## 2023-07-17 DIAGNOSIS — S42.401A UNSPECIFIED FRACTURE OF LOWER END OF RIGHT HUMERUS, INITIAL ENCOUNTER FOR CLOSED FRACTURE: Chronic | ICD-10-CM

## 2023-07-17 DIAGNOSIS — Z98.890 OTHER SPECIFIED POSTPROCEDURAL STATES: Chronic | ICD-10-CM

## 2023-07-17 DIAGNOSIS — K04.4 ACUTE APICAL PERIODONTITIS OF PULPAL ORIGIN: ICD-10-CM

## 2023-07-17 PROCEDURE — D0220: CPT

## 2023-07-17 PROCEDURE — D0150: CPT

## 2023-07-17 NOTE — PROCEDURE
[FreeTextEntry1] : Fluoroscopic Guided Greater Trochanteric Bursa Injection  [FreeTextEntry3] : NO procedure done today

## 2023-07-18 ENCOUNTER — APPOINTMENT (OUTPATIENT)
Dept: PAIN MANAGEMENT | Facility: CLINIC | Age: 63
End: 2023-07-18
Payer: MEDICAID

## 2023-07-18 ENCOUNTER — APPOINTMENT (OUTPATIENT)
Dept: PAIN MANAGEMENT | Facility: CLINIC | Age: 63
End: 2023-07-18

## 2023-07-18 PROCEDURE — 93040 RHYTHM ECG WITH REPORT: CPT | Mod: 79

## 2023-07-18 PROCEDURE — 94761 N-INVAS EAR/PLS OXIMETRY MLT: CPT

## 2023-07-18 PROCEDURE — 77002 NEEDLE LOCALIZATION BY XRAY: CPT | Mod: 79

## 2023-07-18 PROCEDURE — 64450 NJX AA&/STRD OTHER PN/BRANCH: CPT | Mod: RT

## 2023-07-18 PROCEDURE — 93770 DETERMINATION VENOUS PRESS: CPT

## 2023-07-18 NOTE — PROCEDURE
[FreeTextEntry1] : RADIOFREQUENCY ABLATION OF THE SUPERIOR MEDIAL GENICULAR, SUPERIOR LATERAL GENICULAR AND INFERIOR MEDIAL GENICULAR NERVE INJECTION UNDER FLURO GUIDANCE [FreeTextEntry3] : RADIOFREQUENCY ABLATION OF THE SUPERIOR MEDIAL GENICULAR, SUPERIOR LATERAL GENICULAR AND INFERIOR MEDIAL GENICULAR NERVE INJECTION UNDER FLURO GUIDANCE\par \par Pre-op Diagnosis: Knee pain, knee osteoarthritis and knee arthropathy.\par Post-op Diagnosis: Same\par Procedure: Radiofrequency ablation of the Superior medial genicular, superior lateral genicular and inferior medial genicular nerve injection with fluro guidance of the right knee.\par Anesthesia:See nurses note/Local/Cold spray.\par Physician: Michel BUCK\par \par Medical necessity: Failure of conservative medical management\par \par CONSENT: The possible complications including infection, bleeding, nerve damage, death or failure of the procedure; though unusual, are theoretically possible. The patient was educated about the of the procedure and alternative therapies. All questions were answered and the patient freely gave consent to proceed.\par \par Monitoring:  Patient had continuous blood pressure, EKG, and pulse oximetry throughout the case. See nurse's notes.\par \par Procedure Note:\par After obtaining written consent, the patient was placed in a supine position. The patient's knee was then placed on pillows as tolerated to offset a clear lateral view of each leg. The skin of the target knee was prepped with a chloraprep solution and draped.sticks. The radiofrequency ablation 100 mm disposable Kenneth*-coated cannula with a 5 mm active tip was adjusted via the sizing collar so that the electrode fit just inside the inner bevel at the end of the bare metal. Prior to beginning the procedure, the internal test mode function of the radiofrequency unit was checked to make sure the machine was functioning properly. On the affected knee the superior medial and lateral epicondyle of the femur as well as the distal aspect of the medial tibial epicondyle was identified as the target zone for the injection using an AP view. Each of these regions was marked and the skin and subcutaneous tissue of these regions were infiltrated with cold spray than  several mLs of local anesthesia prior to inserting a radiofrequency ablation  100 mm disposable Kenneth*-coated cannula with a 5 mm active tip. The needles were advanced using fluoroscopic guidance until reaching the mid level of the femur and tibia confirmed with a lateral view.  Sensory stimulation at 50 hertz was performed at each target area. Referral of the sensory stimulation was noted at less than 1 volt over the paravertebral area or hip. Motor dissociation at 2 hertz was obtained by stimulating up to 3 times the voltage of the sensory stimulation and insuring a lack of motor movement in the lower extremities. Once the radiofrequency cannula was in correct position, a 2% Lidocaine with 10mg of depomedrol solution was used to rapidly anesthetize the lesion site. After a thirty second delay, thermal lesions were then created at each level for 60 seconds at a temperature of 80 C. After the lesions were created, the cannulas were removed and sterile bandages placed at the puncture sites. The patient tolerated the procedure well. \par \par Complications: none\par  \par Disposition: I have examined the patient and there are no new physical findings since the original presentation.  Sensory and motor function were intact. The patient met discharge criteria see nurses notes. The patient was discharged home with a .  The discharge instruction sheet was given to the patient\par \par Comment: Patient had diagnostic genicular injections with 70% relief greater than 2 hours, RFA today, follow up in the office in 2-4 weeks. Call if any problems.\par \par Indication for RFA: The pt had a positive response to the genicular injections and is considered a good candidate for the thermal RF ablation procedure. The diagnostic injection provided at least 70% reduction in pain for 1 hour or 50% relief for 24 hours and the following criteria have been met. Failed response physical therapy, steroid injection, hyaline supplementation injection, not surgical candidate or does not what to proceed with surgery.\par \par \par Michel Vasquez D.O. \par Diplomat, American Board of Anesthesiology\par Diplomat, American Board of Pain Medicine\par Diplomat, American Board of Pain Management\par

## 2023-08-01 ENCOUNTER — APPOINTMENT (OUTPATIENT)
Dept: PAIN MANAGEMENT | Facility: CLINIC | Age: 63
End: 2023-08-01
Payer: MEDICAID

## 2023-08-01 VITALS
SYSTOLIC BLOOD PRESSURE: 142 MMHG | HEIGHT: 67 IN | HEART RATE: 92 BPM | BODY MASS INDEX: 31.39 KG/M2 | DIASTOLIC BLOOD PRESSURE: 86 MMHG | WEIGHT: 200 LBS

## 2023-08-01 DIAGNOSIS — Z86.59 PERSONAL HISTORY OF OTHER MENTAL AND BEHAVIORAL DISORDERS: ICD-10-CM

## 2023-08-01 DIAGNOSIS — Z87.39 PERSONAL HISTORY OF OTHER DISEASES OF THE MUSCULOSKELETAL SYSTEM AND CONNECTIVE TISSUE: ICD-10-CM

## 2023-08-01 PROCEDURE — 99213 OFFICE O/P EST LOW 20 MIN: CPT
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
## 2023-08-01 NOTE — DISCUSSION/SUMMARY
[de-identified] : I have consulted the  registry for the purpose of reviewing the patient's controlled substance.  Overall there is at least a 30-50% reduction in pain with the prescribed analgesics. The patient denies any adverse side effects due to the medication (sleeping disturbance, constipation, sleepiness, hallucinations and/or urination problems).  There are no inconsistencies on urine toxicology screening which would necessitate an alteration of therapy. The patient will return to the office in 4 weeks time and is aware to contact me with any question or concerns in the interim.  KLEVER Banks, DO

## 2023-08-01 NOTE — PHYSICAL EXAM
[de-identified] : GENERAL APPEARANCE OF PATIENT IS WELL DEVELOPED, WELL NOURISHED, BODY HABITUS NORMAL, WELL GROOMED, NO DEFORMITIES NOTED. \par  Head - Atraumatic and Normocephalic \par  Eyes, Nose, and Throat: External inspection of ears and nose are normal overall without scars, lesions, or masses noted. Assessment of hearing is normal\par  Neck-Examination of neck shows no masses, overall appearance is normal, neck is symmetric, tracheal position is midline, no crepitus is noted. Examination of thyroid shows no enlargement, tenderness or masses\par  Respiratory- Assessment of respiratory effort shows no intercostal retractions, no use of accessory muscles, unlabored breathing, and normal diaphragmatic movement.\par  Cardiovascular- Examination of extremities show no edema or varicosities\par  Musculoskeletal. Examination of gait is not antalgic and station is normal\par  Inspection and palpation of digits and nails shows no clubbing, cyanosis, nodules, drainage, fluctuance, petechiae\par  \par  - Spine - inspection and palpation shows no misalignment, asymmetry, crepitation, defects, tenderness, masses, effusions. ROM is normal without crepitation or contracture. No instability or subluxation or laxity is noted. No abnormal movements.\par  \par  \par  - Neck- inspection and palpation shows no misalignment, asymmetry, crepitation, defects, tenderness, masses, effusions. ROM is normal without crepitation or contracture. No instability or subluxation or laxity is noted. No abnormal movements.\par  \par  \par  - RUE- limited range of motion in extension of the elbow\par  \par  \par  - LUE- inspection and palpation shows no misalignment, asymmetry, crepitation, defects, tenderness, masses, effusions. ROM is normal without crepitation or contracture. No instability or subluxation or laxity is noted. No abnormal movements.\par  \par  \par  - RLE- greater trochanter tenderness. Right groin pain. Diffuse tenderness throughout right knee. Crepitus in the right knee. \par  \par  \par  - LLE- inspection and palpation shows no misalignment, asymmetry, crepitation, defects, tenderness, masses, effusions. ROM is normal without crepitation or contracture. No instability or subluxation or laxity is noted. No abnormal movements.\par  \par  \par  - Skin- Inspection of skin and subcutaneous tissue shows no rashes, lesions or ulcers. Palpation of skin and subcutaneous tissue shows no rashes, no indurations, subcutaneous nodules or tightening.\par  \par  \par  - Abdomen- Nontender\par  \par  \par  - Neurologic- CN 2-12 are grossly intact. No sensory or motor deficits in the upper and lower extremities. Adequate strength in upper and lower extremities \par  \par  \par  - Psychiatric- Patient's judgment and insight are intact. Oriented to time, place and person. Recent and remote memory intact.\par

## 2023-08-01 NOTE — ASSESSMENT
[FreeTextEntry1] : Mr. Markell Echeverria is a 62 year old male with a chief complaint of right knee and right hip pain. He has undergone a right hip replacement, however he is still having pain. He has also had right knee surgery with no relief of his pain. He recently underwent a right knee genicular RFA on 7/18/23. He reports 70% relief from the RFA. He is scheduled to undergo a right GTB injection next week. He will continue all of his medications as is. RTO in 4 weeks.

## 2023-08-22 ENCOUNTER — APPOINTMENT (OUTPATIENT)
Dept: PAIN MANAGEMENT | Facility: CLINIC | Age: 63
End: 2023-08-22
Payer: MEDICAID

## 2023-08-22 PROCEDURE — 20610 DRAIN/INJ JOINT/BURSA W/O US: CPT | Mod: RT

## 2023-08-22 PROCEDURE — 77002 NEEDLE LOCALIZATION BY XRAY: CPT | Mod: 79

## 2023-08-22 NOTE — PROCEDURE
[FreeTextEntry1] : Fluoroscopic Guided Greater Trochanteric Bursa Injection [FreeTextEntry3] : Fluoroscopic Guided Greater Trochanteric Bursa Injection  Pre-op Diagnosis: Right Greater Trochanteric Bursitis Post-op Diagnosis: Same Procedure: Right  Greater trochanteric bursa injection with fluoroscopic guidance. Physician: Michel Vasquez D.O. Anesthesia: Cold spray  Indication for Fluoroscopy:  This procedure requires the precise placement of the needle.  It is the only way to accurately and safely perform the injection.  CONSENT: The possible complications including infection, bleeding, nerve damage, hospital admission or failure of the procedure; though unusual, are theoretically possible. The patient was educated about the of the procedure and alternative therapies. All questions were answered and the patient freely gave consent to proceed.  PROCEDURE NOTE After obtaining written consent, the areas of point tenderness in the right/ left greater trochanteric bursa were identified. The crest of the greater trochanter was confirmed with fluoroscopy. The skin was and prepped with Betadine x3 in a sterile fashion. A time out was performed. The area was localized with cold spray.  Using a 22-gauge needle, the bursa was injected with 7cc 2% lidocaine with 1cc depomedrol 40mg. There were no signs of, intravascular block or hypotension. The needle was removed intact. A band aid was place on the site.  Complications: None. The patient tolerated the procedure well.   Disposition: I have examined the patient and there are no new physical findings since the original presentation.  Sensory and motor function were intact. The patient met discharge criteria see nurses notes. The discharge instruction sheet was reviewed and given to the patient. The patient was discharged home with a driverIf patient gets sustained relief will have patient do Elliptical machine (with hands planted) and/or recombinant bike at 1 intensity starting at 50 RPMs building to 70 RPMs, starting at 5 minutes building to 30 minutes 3 days in a row with a day break.   Comment: If greater than 50% relief with ongoing 30% pain relief for 4-6 months can be repeated in 6 months vs regenerative medicine. Will follow up in office. Call if any problems.   Michel Vasquez D.O.  Diplomat, American Board of Anesthesiology  Diplomat, American Board of Pain Medicine  Diplomat, American Board of Pain Management

## 2023-08-28 ENCOUNTER — APPOINTMENT (OUTPATIENT)
Dept: PAIN MANAGEMENT | Facility: CLINIC | Age: 63
End: 2023-08-28
Payer: MEDICAID

## 2023-08-28 VITALS
DIASTOLIC BLOOD PRESSURE: 79 MMHG | WEIGHT: 200 LBS | SYSTOLIC BLOOD PRESSURE: 124 MMHG | BODY MASS INDEX: 31.39 KG/M2 | HEIGHT: 67 IN | HEART RATE: 102 BPM

## 2023-08-28 PROCEDURE — 99213 OFFICE O/P EST LOW 20 MIN: CPT
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
## 2023-08-28 NOTE — PHYSICAL EXAM
[de-identified] : GENERAL APPEARANCE OF PATIENT IS WELL DEVELOPED, WELL NOURISHED, BODY HABITUS NORMAL, WELL GROOMED, NO DEFORMITIES NOTED. \par  Head - Atraumatic and Normocephalic \par  Eyes, Nose, and Throat: External inspection of ears and nose are normal overall without scars, lesions, or masses noted. Assessment of hearing is normal\par  Neck-Examination of neck shows no masses, overall appearance is normal, neck is symmetric, tracheal position is midline, no crepitus is noted. Examination of thyroid shows no enlargement, tenderness or masses\par  Respiratory- Assessment of respiratory effort shows no intercostal retractions, no use of accessory muscles, unlabored breathing, and normal diaphragmatic movement.\par  Cardiovascular- Examination of extremities show no edema or varicosities\par  Musculoskeletal. Examination of gait is not antalgic and station is normal\par  Inspection and palpation of digits and nails shows no clubbing, cyanosis, nodules, drainage, fluctuance, petechiae\par  \par  - Spine - inspection and palpation shows no misalignment, asymmetry, crepitation, defects, tenderness, masses, effusions. ROM is normal without crepitation or contracture. No instability or subluxation or laxity is noted. No abnormal movements.\par  \par  \par  - Neck- inspection and palpation shows no misalignment, asymmetry, crepitation, defects, tenderness, masses, effusions. ROM is normal without crepitation or contracture. No instability or subluxation or laxity is noted. No abnormal movements.\par  \par  \par  - RUE- limited range of motion in extension of the elbow\par  \par  \par  - LUE- inspection and palpation shows no misalignment, asymmetry, crepitation, defects, tenderness, masses, effusions. ROM is normal without crepitation or contracture. No instability or subluxation or laxity is noted. No abnormal movements.\par  \par  \par  - RLE- greater trochanter tenderness. Right groin pain. Diffuse tenderness throughout right knee. Crepitus in the right knee. \par  \par  \par  - LLE- inspection and palpation shows no misalignment, asymmetry, crepitation, defects, tenderness, masses, effusions. ROM is normal without crepitation or contracture. No instability or subluxation or laxity is noted. No abnormal movements.\par  \par  \par  - Skin- Inspection of skin and subcutaneous tissue shows no rashes, lesions or ulcers. Palpation of skin and subcutaneous tissue shows no rashes, no indurations, subcutaneous nodules or tightening.\par  \par  \par  - Abdomen- Nontender\par  \par  \par  - Neurologic- CN 2-12 are grossly intact. No sensory or motor deficits in the upper and lower extremities. Adequate strength in upper and lower extremities \par  \par  \par  - Psychiatric- Patient's judgment and insight are intact. Oriented to time, place and person. Recent and remote memory intact.\par

## 2023-08-28 NOTE — ASSESSMENT
[FreeTextEntry1] : Mr. Markell Echeverria is a 62 year old male with a chief complaint of right knee and right hip pain. He continues with relief from a right knee genicular RFA on 7/18/23. He is s/p a right GTB injection on 8/22/23 with minimal relief. We discussed trialing a Right SI joint injection on a future date to target his posterior buttock pain. He would like to think about it until his next follow up. He will continue all of his medications as is. RTO in 4 weeks.  I have consulted the  registry for the purpose of reviewing the patient's controlled substance.  Overall there is at least a 30-50% reduction in pain with the prescribed analgesics. The patient denies any adverse side effects due to the medication (sleeping disturbance, constipation, sleepiness, hallucinations and/or urination problems).   KLEVER Nuno D.O

## 2023-08-29 ENCOUNTER — APPOINTMENT (OUTPATIENT)
Dept: PAIN MANAGEMENT | Facility: CLINIC | Age: 63
End: 2023-08-29

## 2023-09-26 ENCOUNTER — APPOINTMENT (OUTPATIENT)
Dept: PAIN MANAGEMENT | Facility: CLINIC | Age: 63
End: 2023-09-26
Payer: MEDICAID

## 2023-09-26 DIAGNOSIS — M70.61 TROCHANTERIC BURSITIS, RIGHT HIP: ICD-10-CM

## 2023-09-26 PROCEDURE — 99214 OFFICE O/P EST MOD 30 MIN: CPT

## 2023-10-27 ENCOUNTER — LABORATORY RESULT (OUTPATIENT)
Age: 63
End: 2023-10-27

## 2023-10-27 ENCOUNTER — APPOINTMENT (OUTPATIENT)
Dept: PAIN MANAGEMENT | Facility: CLINIC | Age: 63
End: 2023-10-27
Payer: MEDICAID

## 2023-10-27 LAB
AMP / AMPHETAMINE: NEGATIVE
BAR / SECOBARBITAL: NEGATIVE
BUP / BUPRENORPHINE: NEGATIVE
BZO / OXAZEPAM: NEGATIVE
COC / COCAINE: NEGATIVE
MDMA / METHYLENEDIOXYMETHAMPHETAMINE: NEGATIVE
MET / METHAMPHETAMINES: NEGATIVE
MOP / MORPHINE: NEGATIVE
MTD / METHADONE: NEGATIVE
OXY / OXYCODONE: POSITIVE
PCP / PHENCYCLIDINE: NEGATIVE
TEMPERATURE: 32 C
THC / MARIJUANA: NEGATIVE

## 2023-10-27 PROCEDURE — 80305 DRUG TEST PRSMV DIR OPT OBS: CPT | Mod: QW

## 2023-10-27 PROCEDURE — 99214 OFFICE O/P EST MOD 30 MIN: CPT | Mod: 25

## 2023-10-31 LAB
PM 6 MAM: NEGATIVE NG/ML
PM 7-AMINO-CLONAZ: NEGATIVE NG/ML
PM ALPHA-HYDROXY-ALPRAZOLAM: NEGATIVE NG/ML
PM ALPHA-HYDROXY-MIDAZOLAM: NEGATIVE NG/ML
PM ALPRAZOLAM: NEGATIVE NG/ML
PM AMOBARBITAL: NEGATIVE NG/ML
PM AMPHETAMINE INTERP: NEGATIVE
PM AMPHETAMINE: NEGATIVE NG/ML
PM BARBURATES INTERP: NEGATIVE
PM BEG: NEGATIVE NG/ML
PM BENZODIAZEPINES INTERP: NEGATIVE
PM BUPRENORPHINE INTERP: NEGATIVE
PM BUPRENORPHINE: NEGATIVE NG/ML
PM BUTALBITAL: NEGATIVE NG/ML
PM CLONAZEPAM: NEGATIVE NG/ML
PM COCAINE INTERP: NEGATIVE
PM COCAINE: NEGATIVE NG/ML
PM CODIENE: NEGATIVE NG/ML
PM COTININE: NEGATIVE NG/ML
PM DIAZEPAM: NEGATIVE NG/ML
PM DIHYROCODEINE: NEGATIVE NG/ML
PM EDDP: NEGATIVE NG/ML
PM FENTANYL INTERP: NEGATIVE
PM FENTANYL: NEGATIVE NG/ML
PM FLUNITRAZEPAM: NEGATIVE NG/ML
PM FLURAZEPAM: NEGATIVE NG/ML
PM HYDROCODONE: NEGATIVE NG/ML
PM HYDROMORPHONE: NEGATIVE NG/ML
PM LORAZEPAM: NEGATIVE NG/ML
PM MARIJUANA (DELTA-9-THC): NEGATIVE NG/ML
PM MARIJUANA INTERP: NEGATIVE
PM MDA: NEGATIVE NG/ML
PM MDEA: NEGATIVE NG/ML
PM MDMA: NEGATIVE NG/ML
PM MEPERIDINE: NEGATIVE NG/ML
PM METHADONE INTERP: NEGATIVE
PM METHADONE: NEGATIVE NG/ML
PM METHAMPHETAMINE: NEGATIVE NG/ML
PM MIDAZOLAM: NEGATIVE NG/ML
PM MORPHINE: NEGATIVE NG/ML
PM NALOXONE: NEGATIVE NG/ML
PM NALTREXONE: NEGATIVE NG/ML
PM NICOTINE INTERP: NEGATIVE
PM NORBUPRENORPHINE: NEGATIVE NG/ML
PM NORDIAZEPAM: NEGATIVE NG/ML
PM NORFENTANYL: NEGATIVE NG/ML
PM NORMEPERIDINE: NEGATIVE NG/ML
PM NOROXYCODONE: 810 NG/ML
PM OPIOID INTERP: NEGATIVE
PM OXAZEPAM: NEGATIVE NG/ML
PM OXXYCODONE INTERP: POSITIVE
PM OXYCODONE: 486 NG/ML
PM OXYMORPHONE: 110 NG/ML
PM PCP: NEGATIVE NG/ML
PM PHENCYCLIDINE INTERP: NEGATIVE
PM PHENOBARBITAL: NEGATIVE NG/ML
PM PPX: NEGATIVE NG/ML
PM PROPOXYPHENE INTERP: NEGATIVE
PM SECOBARBITAL: NEGATIVE NG/ML
PM SUFENTANIL: NEGATIVE NG/ML
PM TAPENTADOL: NEGATIVE NG/ML
PM TEMAZEPAM: NEGATIVE NG/ML
PM TRAMADOL INTERP: NEGATIVE
PM TRAMADOL: NEGATIVE NG/ML

## 2023-11-27 ENCOUNTER — APPOINTMENT (OUTPATIENT)
Dept: PAIN MANAGEMENT | Facility: CLINIC | Age: 63
End: 2023-11-27
Payer: MEDICAID

## 2023-11-27 VITALS — BODY MASS INDEX: 31.39 KG/M2 | HEIGHT: 67 IN | WEIGHT: 200 LBS

## 2023-11-27 DIAGNOSIS — G89.29 OTHER CHRONIC PAIN: ICD-10-CM

## 2023-11-27 DIAGNOSIS — M17.11 UNILATERAL PRIMARY OSTEOARTHRITIS, RIGHT KNEE: ICD-10-CM

## 2023-11-27 DIAGNOSIS — M16.11 UNILATERAL PRIMARY OSTEOARTHRITIS, RIGHT HIP: ICD-10-CM

## 2023-11-27 DIAGNOSIS — Z79.891 LONG TERM (CURRENT) USE OF OPIATE ANALGESIC: ICD-10-CM

## 2023-11-27 PROCEDURE — 99214 OFFICE O/P EST MOD 30 MIN: CPT

## 2023-11-27 RX ORDER — TIZANIDINE 4 MG/1
4 TABLET ORAL
Qty: 60 | Refills: 0 | Status: ACTIVE | COMMUNITY
Start: 2023-08-07 | End: 1900-01-01

## 2023-11-27 RX ORDER — GABAPENTIN 600 MG/1
600 TABLET, COATED ORAL 3 TIMES DAILY
Qty: 90 | Refills: 2 | Status: ACTIVE | COMMUNITY
Start: 2023-06-06 | End: 1900-01-01

## 2023-11-27 RX ORDER — OXYCODONE AND ACETAMINOPHEN 10; 325 MG/1; MG/1
10-325 TABLET ORAL
Qty: 75 | Refills: 0 | Status: ACTIVE | COMMUNITY
Start: 2023-06-06 | End: 1900-01-01

## 2023-11-27 RX ORDER — NAPROXEN 500 MG/1
500 TABLET ORAL
Qty: 60 | Refills: 2 | Status: ACTIVE | COMMUNITY
Start: 2023-06-06 | End: 1900-01-01

## 2023-11-28 ENCOUNTER — OUTPATIENT (OUTPATIENT)
Dept: OUTPATIENT SERVICES | Facility: HOSPITAL | Age: 63
LOS: 1 days | End: 2023-11-28
Payer: COMMERCIAL

## 2023-11-28 DIAGNOSIS — Z98.890 OTHER SPECIFIED POSTPROCEDURAL STATES: Chronic | ICD-10-CM

## 2023-11-28 DIAGNOSIS — Z87.81 PERSONAL HISTORY OF (HEALED) TRAUMATIC FRACTURE: Chronic | ICD-10-CM

## 2023-11-28 DIAGNOSIS — K02.7 DENTAL ROOT CARIES: ICD-10-CM

## 2023-11-28 DIAGNOSIS — S42.401A UNSPECIFIED FRACTURE OF LOWER END OF RIGHT HUMERUS, INITIAL ENCOUNTER FOR CLOSED FRACTURE: Chronic | ICD-10-CM

## 2023-11-28 PROCEDURE — D9310: CPT

## 2023-12-04 ENCOUNTER — APPOINTMENT (OUTPATIENT)
Dept: PAIN MANAGEMENT | Facility: CLINIC | Age: 63
End: 2023-12-04

## 2023-12-28 ENCOUNTER — APPOINTMENT (OUTPATIENT)
Dept: PAIN MANAGEMENT | Facility: CLINIC | Age: 63
End: 2023-12-28

## 2024-03-16 ENCOUNTER — OUTPATIENT (OUTPATIENT)
Dept: OUTPATIENT SERVICES | Facility: HOSPITAL | Age: 64
LOS: 1 days | End: 2024-03-16
Payer: MEDICAID

## 2024-03-16 DIAGNOSIS — S42.401A UNSPECIFIED FRACTURE OF LOWER END OF RIGHT HUMERUS, INITIAL ENCOUNTER FOR CLOSED FRACTURE: Chronic | ICD-10-CM

## 2024-03-16 DIAGNOSIS — Z87.81 PERSONAL HISTORY OF (HEALED) TRAUMATIC FRACTURE: Chronic | ICD-10-CM

## 2024-03-16 DIAGNOSIS — Z98.890 OTHER SPECIFIED POSTPROCEDURAL STATES: Chronic | ICD-10-CM

## 2024-03-16 DIAGNOSIS — M54.16 RADICULOPATHY, LUMBAR REGION: ICD-10-CM

## 2024-03-16 PROCEDURE — 72100 X-RAY EXAM L-S SPINE 2/3 VWS: CPT | Mod: 26

## 2024-03-16 PROCEDURE — 73560 X-RAY EXAM OF KNEE 1 OR 2: CPT | Mod: 26,LT,RT

## 2024-03-16 PROCEDURE — 73560 X-RAY EXAM OF KNEE 1 OR 2: CPT | Mod: 50

## 2024-03-16 PROCEDURE — 72100 X-RAY EXAM L-S SPINE 2/3 VWS: CPT

## 2024-03-17 DIAGNOSIS — M54.16 RADICULOPATHY, LUMBAR REGION: ICD-10-CM

## 2025-01-23 ENCOUNTER — EMERGENCY (EMERGENCY)
Facility: HOSPITAL | Age: 65
LOS: 0 days | Discharge: HOME | End: 2025-01-24
Attending: STUDENT IN AN ORGANIZED HEALTH CARE EDUCATION/TRAINING PROGRAM
Payer: MEDICAID

## 2025-01-23 VITALS — TEMPERATURE: 99 F

## 2025-01-23 VITALS
TEMPERATURE: 99 F | HEART RATE: 114 BPM | WEIGHT: 160.06 LBS | DIASTOLIC BLOOD PRESSURE: 94 MMHG | OXYGEN SATURATION: 96 % | RESPIRATION RATE: 18 BRPM | SYSTOLIC BLOOD PRESSURE: 129 MMHG | HEIGHT: 65 IN

## 2025-01-23 DIAGNOSIS — T42.6X1A POISONING BY OTHER ANTIEPILEPTIC AND SEDATIVE-HYPNOTIC DRUGS, ACCIDENTAL (UNINTENTIONAL), INITIAL ENCOUNTER: ICD-10-CM

## 2025-01-23 DIAGNOSIS — S42.401A UNSPECIFIED FRACTURE OF LOWER END OF RIGHT HUMERUS, INITIAL ENCOUNTER FOR CLOSED FRACTURE: Chronic | ICD-10-CM

## 2025-01-23 DIAGNOSIS — G40.909 EPILEPSY, UNSPECIFIED, NOT INTRACTABLE, WITHOUT STATUS EPILEPTICUS: ICD-10-CM

## 2025-01-23 DIAGNOSIS — M79.18 MYALGIA, OTHER SITE: ICD-10-CM

## 2025-01-23 DIAGNOSIS — R53.83 OTHER FATIGUE: ICD-10-CM

## 2025-01-23 DIAGNOSIS — F25.1 SCHIZOAFFECTIVE DISORDER, DEPRESSIVE TYPE: ICD-10-CM

## 2025-01-23 DIAGNOSIS — R63.0 ANOREXIA: ICD-10-CM

## 2025-01-23 DIAGNOSIS — Z87.81 PERSONAL HISTORY OF (HEALED) TRAUMATIC FRACTURE: Chronic | ICD-10-CM

## 2025-01-23 DIAGNOSIS — Z98.890 OTHER SPECIFIED POSTPROCEDURAL STATES: Chronic | ICD-10-CM

## 2025-01-23 LAB
ALBUMIN SERPL ELPH-MCNC: 3.9 G/DL — SIGNIFICANT CHANGE UP (ref 3.5–5.2)
ALP SERPL-CCNC: 80 U/L — SIGNIFICANT CHANGE UP (ref 30–115)
ALT FLD-CCNC: 9 U/L — SIGNIFICANT CHANGE UP (ref 0–41)
ANION GAP SERPL CALC-SCNC: 14 MMOL/L — SIGNIFICANT CHANGE UP (ref 7–14)
APAP SERPL-MCNC: <5 UG/ML — LOW (ref 10–30)
APPEARANCE UR: CLEAR — SIGNIFICANT CHANGE UP
AST SERPL-CCNC: 19 U/L — SIGNIFICANT CHANGE UP (ref 0–41)
BACTERIA # UR AUTO: NEGATIVE /HPF — SIGNIFICANT CHANGE UP
BASE EXCESS BLDV CALC-SCNC: 0.7 MMOL/L — SIGNIFICANT CHANGE UP (ref -2–3)
BILIRUB SERPL-MCNC: 0.3 MG/DL — SIGNIFICANT CHANGE UP (ref 0.2–1.2)
BILIRUB UR-MCNC: NEGATIVE — SIGNIFICANT CHANGE UP
BUN SERPL-MCNC: 9 MG/DL — LOW (ref 10–20)
CA-I SERPL-SCNC: 1.12 MMOL/L — LOW (ref 1.15–1.33)
CALCIUM SERPL-MCNC: 8.8 MG/DL — SIGNIFICANT CHANGE UP (ref 8.4–10.4)
CAST: 0 /LPF — SIGNIFICANT CHANGE UP (ref 0–4)
CHLORIDE SERPL-SCNC: 103 MMOL/L — SIGNIFICANT CHANGE UP (ref 98–110)
CO2 SERPL-SCNC: 23 MMOL/L — SIGNIFICANT CHANGE UP (ref 17–32)
COLOR SPEC: YELLOW — SIGNIFICANT CHANGE UP
CREAT SERPL-MCNC: 0.9 MG/DL — SIGNIFICANT CHANGE UP (ref 0.7–1.5)
DIFF PNL FLD: ABNORMAL
EGFR: 96 ML/MIN/1.73M2 — SIGNIFICANT CHANGE UP
ETHANOL SERPL-MCNC: <10 MG/DL — SIGNIFICANT CHANGE UP
GAS PNL BLDV: 131 MMOL/L — LOW (ref 136–145)
GAS PNL BLDV: SIGNIFICANT CHANGE UP
GAS PNL BLDV: SIGNIFICANT CHANGE UP
GLUCOSE SERPL-MCNC: 79 MG/DL — SIGNIFICANT CHANGE UP (ref 70–99)
GLUCOSE UR QL: NEGATIVE MG/DL — SIGNIFICANT CHANGE UP
HCO3 BLDV-SCNC: 25 MMOL/L — SIGNIFICANT CHANGE UP (ref 22–29)
HCT VFR BLD CALC: 42.4 % — SIGNIFICANT CHANGE UP (ref 42–52)
HCT VFR BLDA CALC: 43 % — SIGNIFICANT CHANGE UP (ref 39–51)
HGB BLD CALC-MCNC: 14.2 G/DL — SIGNIFICANT CHANGE UP (ref 12.6–17.4)
HGB BLD-MCNC: 14.4 G/DL — SIGNIFICANT CHANGE UP (ref 14–18)
KETONES UR-MCNC: 15 MG/DL
LACTATE BLDV-MCNC: 1 MMOL/L — SIGNIFICANT CHANGE UP (ref 0.5–2)
LACTATE SERPL-SCNC: 1.1 MMOL/L — SIGNIFICANT CHANGE UP (ref 0.7–2)
LEUKOCYTE ESTERASE UR-ACNC: NEGATIVE — SIGNIFICANT CHANGE UP
MCHC RBC-ENTMCNC: 31.3 PG — HIGH (ref 27–31)
MCHC RBC-ENTMCNC: 34 G/DL — SIGNIFICANT CHANGE UP (ref 32–37)
MCV RBC AUTO: 92.2 FL — SIGNIFICANT CHANGE UP (ref 80–94)
NITRITE UR-MCNC: NEGATIVE — SIGNIFICANT CHANGE UP
NRBC # BLD: 0 /100 WBCS — SIGNIFICANT CHANGE UP (ref 0–0)
PCO2 BLDV: 40 MMHG — LOW (ref 42–55)
PH BLDV: 7.41 — SIGNIFICANT CHANGE UP (ref 7.32–7.43)
PH UR: 6 — SIGNIFICANT CHANGE UP (ref 5–8)
PLATELET # BLD AUTO: 178 K/UL — SIGNIFICANT CHANGE UP (ref 130–400)
PMV BLD: 11.7 FL — HIGH (ref 7.4–10.4)
PO2 BLDV: 63 MMHG — HIGH (ref 25–45)
POTASSIUM BLDV-SCNC: 4.9 MMOL/L — SIGNIFICANT CHANGE UP (ref 3.5–5.1)
POTASSIUM SERPL-MCNC: 4.1 MMOL/L — SIGNIFICANT CHANGE UP (ref 3.5–5)
POTASSIUM SERPL-SCNC: 4.1 MMOL/L — SIGNIFICANT CHANGE UP (ref 3.5–5)
PROT SERPL-MCNC: 6.4 G/DL — SIGNIFICANT CHANGE UP (ref 6–8)
PROT UR-MCNC: NEGATIVE MG/DL — SIGNIFICANT CHANGE UP
RBC # BLD: 4.6 M/UL — LOW (ref 4.7–6.1)
RBC # FLD: 13.1 % — SIGNIFICANT CHANGE UP (ref 11.5–14.5)
RBC CASTS # UR COMP ASSIST: 2 /HPF — SIGNIFICANT CHANGE UP (ref 0–4)
SALICYLATES SERPL-MCNC: <0.3 MG/DL — LOW (ref 4–30)
SAO2 % BLDV: 90.8 % — HIGH (ref 67–88)
SODIUM SERPL-SCNC: 140 MMOL/L — SIGNIFICANT CHANGE UP (ref 135–146)
SP GR SPEC: 1.01 — SIGNIFICANT CHANGE UP (ref 1–1.03)
SQUAMOUS # UR AUTO: 0 /HPF — SIGNIFICANT CHANGE UP (ref 0–5)
UROBILINOGEN FLD QL: 1 MG/DL — SIGNIFICANT CHANGE UP (ref 0.2–1)
VALPROATE SERPL-MCNC: 184 UG/ML — CRITICAL HIGH (ref 50–100)
WBC # BLD: 5.71 K/UL — SIGNIFICANT CHANGE UP (ref 4.8–10.8)
WBC # FLD AUTO: 5.71 K/UL — SIGNIFICANT CHANGE UP (ref 4.8–10.8)
WBC UR QL: 4 /HPF — SIGNIFICANT CHANGE UP (ref 0–5)

## 2025-01-23 PROCEDURE — 85014 HEMATOCRIT: CPT

## 2025-01-23 PROCEDURE — 82330 ASSAY OF CALCIUM: CPT

## 2025-01-23 PROCEDURE — 93010 ELECTROCARDIOGRAM REPORT: CPT

## 2025-01-23 PROCEDURE — 85018 HEMOGLOBIN: CPT

## 2025-01-23 PROCEDURE — 82140 ASSAY OF AMMONIA: CPT

## 2025-01-23 PROCEDURE — 80354 DRUG SCREENING FENTANYL: CPT

## 2025-01-23 PROCEDURE — 85027 COMPLETE CBC AUTOMATED: CPT

## 2025-01-23 PROCEDURE — 96374 THER/PROPH/DIAG INJ IV PUSH: CPT

## 2025-01-23 PROCEDURE — 80164 ASSAY DIPROPYLACETIC ACD TOT: CPT

## 2025-01-23 PROCEDURE — 83605 ASSAY OF LACTIC ACID: CPT

## 2025-01-23 PROCEDURE — 70450 CT HEAD/BRAIN W/O DYE: CPT | Mod: 26

## 2025-01-23 PROCEDURE — 99285 EMERGENCY DEPT VISIT HI MDM: CPT | Mod: 25

## 2025-01-23 PROCEDURE — 81001 URINALYSIS AUTO W/SCOPE: CPT

## 2025-01-23 PROCEDURE — 80307 DRUG TEST PRSMV CHEM ANLYZR: CPT

## 2025-01-23 PROCEDURE — 84295 ASSAY OF SERUM SODIUM: CPT

## 2025-01-23 PROCEDURE — 0241U: CPT

## 2025-01-23 PROCEDURE — 36415 COLL VENOUS BLD VENIPUNCTURE: CPT

## 2025-01-23 PROCEDURE — 82803 BLOOD GASES ANY COMBINATION: CPT

## 2025-01-23 PROCEDURE — 70450 CT HEAD/BRAIN W/O DYE: CPT | Mod: MC

## 2025-01-23 PROCEDURE — 99285 EMERGENCY DEPT VISIT HI MDM: CPT

## 2025-01-23 PROCEDURE — 93005 ELECTROCARDIOGRAM TRACING: CPT

## 2025-01-23 PROCEDURE — 84132 ASSAY OF SERUM POTASSIUM: CPT

## 2025-01-23 PROCEDURE — 80053 COMPREHEN METABOLIC PANEL: CPT

## 2025-01-23 PROCEDURE — 94640 AIRWAY INHALATION TREATMENT: CPT

## 2025-01-23 RX ORDER — LEVOCARNITINE 200 MG/ML
6 INJECTION, SOLUTION INTRAVENOUS ONCE
Refills: 0 | Status: DISCONTINUED | OUTPATIENT
Start: 2025-01-23 | End: 2025-01-23

## 2025-01-23 RX ORDER — LEVOCARNITINE 200 MG/ML
6000 INJECTION, SOLUTION INTRAVENOUS ONCE
Refills: 0 | Status: COMPLETED | OUTPATIENT
Start: 2025-01-23 | End: 2025-01-23

## 2025-01-23 RX ORDER — SODIUM CHLORIDE 9 MG/ML
1000 INJECTION, SOLUTION INTRAMUSCULAR; INTRAVENOUS; SUBCUTANEOUS ONCE
Refills: 0 | Status: COMPLETED | OUTPATIENT
Start: 2025-01-23 | End: 2025-01-23

## 2025-01-23 RX ORDER — KETOROLAC TROMETHAMINE 30 MG/ML
15 INJECTION INTRAMUSCULAR; INTRAVENOUS ONCE
Refills: 0 | Status: DISCONTINUED | OUTPATIENT
Start: 2025-01-23 | End: 2025-01-23

## 2025-01-23 RX ADMIN — LEVOCARNITINE 1030 MILLIGRAM(S): 200 INJECTION, SOLUTION INTRAVENOUS at 22:21

## 2025-01-23 RX ADMIN — SODIUM CHLORIDE 1000 MILLILITER(S): 9 INJECTION, SOLUTION INTRAMUSCULAR; INTRAVENOUS; SUBCUTANEOUS at 17:45

## 2025-01-23 NOTE — ED SUB INTERN NOTE - PROGRESS NOTE DETAILS
Spoke with psychiatrist Timbo Richter NP in regards to the patient's mental status. He stated that the pt has been 4 years stable in regards to his schizoaffective disorder and was recently seen on December 31st, 2024. He also states that the patient will have A/V hallucinations at times. He states that the patient takes Ingrezza 40 mg once a day, Invega 9 mg daily, Seroquil 400mg nightly, and Depacote  mg twice a day.

## 2025-01-23 NOTE — ED PROVIDER NOTE - EKG/XRAY ADDITIONAL INFORMATION
Independent interpretation of the EKG performed by Tesha White: Sinus, HR: 90, NV: 170, QTc: 430, ST-T: no ST-T wave changes

## 2025-01-23 NOTE — CONSULT NOTE ADULT - SUBJECTIVE AND OBJECTIVE BOX
MEDICAL TOXICOLOGY CONSULT    HPI: 64 Y M H/O schizoaffective presenting with flu like symptoms, new VPA elevation to 184, chronic use, no acute ingestion, per family mild sedation/sleepiness, per primary team no nystagmus, ataxia, tremor    PAST MEDICAL & SURGICAL HISTORY:  Colitis      Schizoaffective disorder      Elbow fracture, right  s/p ORIF in       S/P right hip fracture  s/p ORIF in       S/P knee surgery  s/p bilateral ORIF in           MEDICATION HISTORY:  levOCARNitine IVPB 6000 milliGRAM(s) IV Intermittent Once        Vital Signs Last 24 Hrs  T(C): 37.3 (2025 17:45), Max: 37.3 (2025 17:45)  T(F): 99.1 (2025 17:45), Max: 99.1 (2025 17:45)  HR: 114 (2025 15:24) (114 - 114)  BP: 129/94 (2025 15:24) (129/94 - 129/94)  BP(mean): --  RR: 18 (2025 15:24) (18 - 18)  SpO2: 96% (2025 15:24) (96% - 96%)    Parameters below as of 2025 15:24  Patient On (Oxygen Delivery Method): room air        SIGNIFICANT LABORATORY STUDIES:                        14.4   5.71  )-----------( 178      ( 2025 18:30 )             42.4           140  |  103  |  9[L]  ----------------------------<  79  4.1   |  23  |  0.9    Ca    8.8      2025 18:30    TPro  6.4  /  Alb  3.9  /  TBili  0.3  /  DBili  x   /  AST  19  /  ALT  9   /  AlkPhos  80            Urinalysis Basic - ( 2025 19:50 )    Color: Yellow / Appearance: Clear / S.015 / pH: x  Gluc: x / Ketone: 15 mg/dL  / Bili: Negative / Urobili: 1.0 mg/dL   Blood: x / Protein: Negative mg/dL / Nitrite: Negative   Leuk Esterase: Negative / RBC: 2 /HPF / WBC 4 /HPF   Sq Epi: x / Non Sq Epi: 0 /HPF / Bacteria: Negative /HPF        Anion Gap: 14  @ 18:30  CK: --  @ 18:30  Troponin:  --   18:30  Pro-BNP:  --   18:30  VBG:  --   @ 18:30  Carboxyhemoglobin %:  --   18:30  Methemoglobin %:  --   18:30  Osmolality Serum:  --   @ 18:30  Aspirin Level: <0.3[L]   @ 18:30  Acetaminophen Level:  <5.0[L]   @ 18:30  Ethanol Level:  --   @ 18:30  Digoxin Level:  --   @ 18:30  Phenytoin Level:  --   @ 18:30  Carbamazepine level:  --   @ 18:30  Lamotrigine level:  --   @ 18:30

## 2025-01-23 NOTE — ED SUB INTERN NOTE - CONSTITUTIONAL, MLM
normal... Well appearing, awake, alert, oriented to person, place, and in no apparent distress. Pt is not oriented to time.

## 2025-01-23 NOTE — ED PROVIDER NOTE - PROGRESS NOTE DETAILS
Dr. La Woodard, DO: Consulted tox for valproic acid level of 184. Recommended L-carnitine 1g q8 and to hold taking valproic acid for 2 more days. Also requested ammonia level and vbg.

## 2025-01-23 NOTE — ED SUB INTERN NOTE - GASTROINTESTINAL, MLM
CC:   Chief Complaint   Patient presents with   • Congestion   • Sinus Problem     C/o sinus pressure, congestion, bilat eye pain for the past 2weeks        SUBJECTIVE:    Syed Wynn is a 82 year old male who presents to [facility] with respiratory symptoms which have been present for one week.   Symptoms include: sinus pressure, drainage, cough and sore throat      Denies: chest pain or shortness of breath.     The remainder of the ROS is negative.    OBJECTIVE:    Vitals:    01/06/23 1330   BP: 110/64   Pulse: 68   Resp: 18   Temp: 96.8 °F (36 °C)   SpO2: 95%      Gen: Alert, well hydrated, in no apparent distress  Ears: TM's intact without erythema, bulging, retraction, or fluid-level. External auditory canal clear  Conjunctiva: clear without injection or discharge  Nose: paranasal tenderness, nasal mucosa moist, pink, without rhinorrhea or sinus tenderness, nasal mucosa erythematous and swollen and purulent rhinorrhea  Neck: supple without cervical adenopathy. No meningismus  Throat: pink and moist without erythema, edema, or exudates and erythema moderate  Heart: regular rate and rhythm and no murmurs, clicks or gallops  Lungs: clear to auscultation without wheezes, rhonchi or rales; normal respiratory effort       Medical Decision Making      Patient was informed of possible serious medical complications of a sinus infection - Patients medical history and risk factor stratification was discussed.    Tests performed and interpreted - No results found for this visit on 01/06/23.      Management involves Reassurance and observation, hydration, symptomatic treatment - antibiotics were recommended today    ASSESSMENT/PLAN:    Sinusitis    Oral antibiotics, decongestants and hydration    Diagnosis and prognosis, treatment and side-effects were explained and all questions were answered satisfactorily and there were no further questions upon discharge.    Electronically signed by: Kranthi Sampson MD   1/6/2023        Abdomen soft, non-tender, no rebound, no guarding.

## 2025-01-23 NOTE — ED SUB INTERN NOTE - OBJECTIVE STATEMENT FT
Pt is a 65 yo male with PMH of schizoaffective disorder, depressive type, brought in by his brother for flu like symptoms and confusion onset 2 days ago. He has associated symptoms of diffuse myalgias, fatigue, lack of appetite, and chills. He denies headache, neck pain, abdominal pain, N/V/D/C, rhinorrhea, sore throat. He states that he has been having trouble with his memory since the onset of his symptoms. He has not taken any medications to relieve his symptoms. No other complaints at this time.

## 2025-01-23 NOTE — ED PROVIDER NOTE - CLINICAL SUMMARY MEDICAL DECISION MAKING FREE TEXT BOX
Throughout ED observation period, pt remained clinically and hemodynamically stable.  cth w/o acute findings  labs notable for elevated valproic acid level- tox consulted w/ recs appreciated   pt s/p long ed observation period   pt given treatment for supratherapeutic valp acid level w/ improvement in mental status to baseline  on dc, pt and family agree pt is back to baseline. family comfortable w/ continued home care, f/u and return precautions

## 2025-01-23 NOTE — ED PROVIDER NOTE - OBJECTIVE STATEMENT
Pt is a 63 yo male with PMHx of schizoaffective disorder, depressive type, brought in by his brother to the ED for flu like symptoms and confusion starting 2 days ago. Pt endorses diffuse myalgias, fatigue, lack of appetite, and chills. Denies headache, neck pain, abdominal pain, N/V/D/C, rhinorrhea, sore throat. Pt states that he has been having trouble with his memory since the onset of his symptoms. He has not taken any medications to relieve his symptoms. Denies any other complaints at this time. Pt states he takes valproic acid .

## 2025-01-23 NOTE — ED ADULT TRIAGE NOTE - HEIGHT IN FEET
Chief Complaint:    Chief Complaint   Patient presents with    Follow-up       History of Present Illness:     Group home resident is here follow-up from ED for a fall had a CT scan done which was unremarkable.  She had laceration of the left forehead which she received glue      ROS:  Review of Systems   Unable to perform ROS: Other   Constitutional: Negative for activity change, chills, fatigue and fever.   HENT: Negative for congestion, ear discharge, ear pain, hearing loss, postnasal drip and rhinorrhea.    Eyes: Negative for pain and visual disturbance.   Respiratory: Negative for cough, chest tightness and shortness of breath.    Cardiovascular: Negative for chest pain and palpitations.   Gastrointestinal: Negative for abdominal pain, diarrhea and vomiting.   Endocrine: Negative for heat intolerance.   Genitourinary: Negative for dysuria, flank pain, frequency and hematuria.   Musculoskeletal: Negative for back pain, gait problem and neck pain.   Skin: Negative for color change and rash.   Neurological: Negative for dizziness, tremors, seizures, numbness and headaches.   Psychiatric/Behavioral: Negative for agitation, hallucinations, self-injury, sleep disturbance and suicidal ideas. The patient is not nervous/anxious.        Past Medical History:   Diagnosis Date    Autism     Blind     Diabetes mellitus     Lives in group home     Mental retardation     Osteoporosis     Retinal detachment     Unsteady gait        Social History:  Social History     Socioeconomic History    Marital status: Single     Spouse name: Not on file    Number of children: Not on file    Years of education: Not on file    Highest education level: Not on file   Occupational History    Not on file   Social Needs    Financial resource strain: Not on file    Food insecurity:     Worry: Not on file     Inability: Not on file    Transportation needs:     Medical: Not on file     Non-medical: Not on file   Tobacco Use     Smoking status: Never Smoker    Smokeless tobacco: Never Used   Substance and Sexual Activity    Alcohol use: No    Drug use: No    Sexual activity: Never     Birth control/protection: None   Lifestyle    Physical activity:     Days per week: Not on file     Minutes per session: Not on file    Stress: Not on file   Relationships    Social connections:     Talks on phone: Not on file     Gets together: Not on file     Attends Congregational service: Not on file     Active member of club or organization: Not on file     Attends meetings of clubs or organizations: Not on file     Relationship status: Not on file   Other Topics Concern    Not on file   Social History Narrative    Not on file       Family History:   family history includes Hypertension in her unknown relative.    Health Maintenance   Topic Date Due    Mammogram  08/23/2020    DEXA SCAN  08/23/2021    TETANUS VACCINE  04/26/2023    Colonoscopy  05/16/2023    Lipid Panel  07/05/2024    Hepatitis C Screening  Completed    Pneumococcal Vaccine (65+ Low/Medium Risk)  Completed       Physical Exam:    Vital Signs  Temp: 98.6 °F (37 °C)  Temp src: Tympanic  Pulse: 79  SpO2: 96 %  BP: 118/68  BP Location: Left arm  Patient Position: Sitting  Height and Weight  Weight: 46.6 kg (102 lb 13.5 oz)]    Body mass index is 18.81 kg/m².    Physical Exam   Constitutional: She appears well-developed.   HENT:   Head:       Mouth/Throat: Oropharynx is clear and moist.   Eyes: Pupils are equal, round, and reactive to light. Conjunctivae are normal.   Neck: Normal range of motion. Neck supple.   Cardiovascular: Normal rate, regular rhythm and normal heart sounds.   No murmur heard.  Pulmonary/Chest: Effort normal and breath sounds normal. No respiratory distress. She has no wheezes. She has no rales. She exhibits no tenderness.   Abdominal: Soft. She exhibits no distension and no mass. There is no tenderness. There is no guarding.   Musculoskeletal: She exhibits no  edema or tenderness.   Lymphadenopathy:     She has no cervical adenopathy.   Neurological: She is alert. She has normal reflexes.   Skin: Skin is warm and dry.   Psychiatric: She has a normal mood and affect. Her behavior is normal. Judgment and thought content normal.       Results for orders placed or performed in visit on 07/05/19   URINALYSIS   Result Value Ref Range    Specimen UA Urine, Clean Catch     Color, UA Straw Yellow, Straw, Ava    Appearance, UA Hazy (A) Clear    pH, UA 6.0 5.0 - 8.0    Specific Gravity, UA 1.005 1.005 - 1.030    Protein, UA Negative Negative    Glucose, UA Negative Negative    Ketones, UA Negative Negative    Bilirubin (UA) Negative Negative    Occult Blood UA 1+ (A) Negative    Nitrite, UA Negative Negative    Leukocytes, UA 2+ (A) Negative   Urinalysis Microscopic   Result Value Ref Range    RBC, UA 2 0 - 4 /hpf    WBC, UA 5 0 - 5 /hpf    Bacteria Occasional None-Occ /hpf    Squam Epithel, UA 2 /hpf    Microscopic Comment SEE COMMENT          Lab Results   Component Value Date    HGBA1C 5.8 05/02/2016       Assessment:      ICD-10-CM ICD-9-CM   1. Facial injury, initial encounter S09.93XA 959.09         Plan:    Wound is healing well continue current care    No orders of the defined types were placed in this encounter.      Current Outpatient Medications   Medication Sig Dispense Refill    blood glucose control, low (TRUE METRIX LEVEL 1) Soln 1 drop by Misc.(Non-Drug; Combo Route) route once daily. 1 each 2    blood glucose control, normal (TRUE METRIX LEVEL 2) Soln 1 drop by Misc.(Non-Drug; Combo Route) route once daily. 1 each 2    blood sugar diagnostic Strp 1 strip by Misc.(Non-Drug; Combo Route) route 2 (two) times daily. 100 strip 2    dextran 70-hypromellose, PF, (NATURAL TEARS, PF,) 0.1-0.3 % Dpet       dietary supplement Powd MASS WEIGHT MICHAEL 1 SHAKE  PER DAY 1 g 0    docusate sodium (COLACE) 100 MG capsule TAKE 1 CAPSULE BY MOUTH EVERY EVENING. 30 capsule 0     EAR DROPS, CARBAMIDE PEROXIDE, 6.5 % otic solution INSTILL 5 DROPS INTO BOTH EARS ONCE DAILY FOR 1 WEEK EVERY MONTH 15 mL 4    lancing device Misc 1 lancet by Misc.(Non-Drug; Combo Route) route 2 (two) times daily. 60 each 2    multivitamin (THERAGRAN) per tablet TAKE 1 TABLET BY MOUTH ONCE DAILY WITH FOOD. 30 tablet 0    potassium chloride SA (K-DUR,KLOR-CON) 20 MEQ tablet TAKE 1 TABLET BY MOUTH ONCE DAILY. 30 tablet 0    sertraline (ZOLOFT) 50 MG tablet TAKE 1 TABLET BY MOUTH ONCE DAILY. 30 tablet 0    timolol maleate 0.5% (TIMOPTIC) 0.5 % Drop INSTILL 1 DROP IN EACH EYE 2 TIMES DAILY 5 mL 12    alendronate (FOSAMAX) 70 MG tablet Take 1 tablet (70 mg total) by mouth every 7 days. (Patient not taking: Reported on 9/25/2019) 4 tablet 11    blood-glucose meter (TRUE METRIX AIR GLUCOSE METER) Misc 1 Device by Misc.(Non-Drug; Combo Route) route once. for 1 dose 1 each 0    polyethylene glycol (GLYCOLAX) 17 gram/dose powder MIX 17 GRAMS OF POWDER IN 8 OUNCES OF WATER OR JUICE AND DRINK DAILY. 510 g 0     No current facility-administered medications for this visit.        There are no discontinued medications.    No follow-ups on file.      Dave Sexton MD               5

## 2025-01-23 NOTE — CONSULT NOTE ADULT - ASSESSMENT
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 Physiologically VPA is metabolized via glucuronidation (50%) bet-oxidation in mitochondria (40%) and omega-oxidation (10%) in the smooth ER. Metabolites from omega-oxidation can inhibit CPS 1, decreasing effective urea cycle, leading to hyperammonemia. Patients with inborn errors in CPS 1 leading to decreased enzyme efficiency can have pathologic inhibition of CPS 1 even with low doses of valproic acid. Patient with acute overdoses of VPA can overcome standard metabolism routes, leading to omega-oxidation becoming the primary route of metabolism. VPA toxicity usually presents with neurologic symptoms with ataxia, gait instability, tremor, severe cases present with coma, respiratory depression and hypotension. Diagnostic testing may find hypernatremia, hypocalcemia, hyperammonemia with any anion gap metabolic acidosis representing severe illness. L-carnitine is the primary antidote for VPA toxicity, increasing beta-oxidation metabolism, and efflux of VPA metabolites. Patient should usually be on chronic L-carnitine supplementation while taking VPA as a chronic medication. In severe cases, VPA is effectively removed via HD.      Recommendations:  Treatment:   1)  Check VBG + ammonia  2)  Treat with L-carnitine x1 dose 100 mg/kg (up to 6 g) infused IV over 30 min. Re-assess after ammonia and VBG, discharge on L-carnitine 100 mg/kg/day PO given orally in divided dose every 8 h up to 3 g/day    All plans discussed with primary managing team.    Thank you for the consultation. Please reach out via Teams with any questions.  Rafa Cook MD, Medical Toxicology Fellow

## 2025-01-23 NOTE — ED PROVIDER NOTE - PHYSICAL EXAMINATION
GENERAL: Well-developed; well-nourished; in no acute distress.   SKIN: warm, dry  HEAD: Normocephalic; atraumatic.  EYES: 2mm pupils, PERRLA, EOMI, no conjunctival erythema  ENT: No nasal discharge; airway clear.  NECK: Supple; non tender.  CARD: Regular rate and rhythm. S1, S2 normal; no murmurs, gallops, or rubs.   RESP: LCTAB; No wheezes, rales, rhonchi, or stridor.  ABD: soft, nontender, nondistended  EXT: Normal ROM.  No LE TTP or edema bilaterally.  NEURO: A/ox3, answers questions appropriately, CN II-XII intact, sensation intact, 5/5x4 strength, normal gait, no tremor, no dysmetria or dysdiadochokinesia   PSYCH: Flat affect

## 2025-01-23 NOTE — ED PROVIDER NOTE - NSFOLLOWUPINSTRUCTIONS_ED_ALL_ED_FT
Hold Valproic acid for 2 days and take levocarnitine for the next 2 days as prescribed.     Follow up with your neurologist next week.    Return to the emergency department for any concerning symptoms like dizziness, abnormal balance, confusion, fevers, vomiting.

## 2025-01-23 NOTE — ED ADULT NURSE NOTE - NSFALLRISKFACTORS_ED_ALL_ED
No indicators present Hemigard Postcare Instructions: The HEMIGARD strips are to remain completely dry for at least 5-7 days.

## 2025-01-23 NOTE — ED PROVIDER NOTE - ATTENDING CONTRIBUTION TO CARE
see mdm for attending note 64-year-old male presents the ED for altered mentation.  Brought in by brother who reports that patient has been acting different for the past couple of days.  Patient reports he has been feeling tired and sick.  Vitals are unremarkable.  Patient is noted to be ANO x 3 to ANO x 2 intermittently.  Appears drowsy.  Reports schizoaffective disorder and taking his prescribed medications for schizoaffective and seizure disorder.  Unknown if patient took more medications or not.  Patient does report that he has not been eating or drinking.  We obtained labs along with toxicology serum labs.  Noted to have valproate level elevated.  Toxicology consulted with recommendations to follow-up ammonia level.  If ammonia levels normal recommendations was to discharge with L-carnitine.  Case is signed out pending ammonia and a final disposition.

## 2025-01-23 NOTE — ED PROVIDER NOTE - PATIENT PORTAL LINK FT
You can access the FollowMyHealth Patient Portal offered by [facility] by registering at the following website: [email or web address]. By joining [facility]’s FollowMyHealth portal, you will also be able to view your health information using other applications (apps) compatible with our system.

## 2025-01-24 LAB
AMMONIA BLD-MCNC: 57 UMOL/L — HIGH (ref 11–55)
AMPHET UR-MCNC: NEGATIVE — SIGNIFICANT CHANGE UP
BARBITURATES UR SCN-MCNC: NEGATIVE — SIGNIFICANT CHANGE UP
BENZODIAZ UR-MCNC: NEGATIVE — SIGNIFICANT CHANGE UP
COCAINE METAB.OTHER UR-MCNC: NEGATIVE — SIGNIFICANT CHANGE UP
FENTANYL UR QL: NEGATIVE — SIGNIFICANT CHANGE UP
FLUAV AG NPH QL: SIGNIFICANT CHANGE UP
FLUBV AG NPH QL: SIGNIFICANT CHANGE UP
METHADONE UR-MCNC: NEGATIVE — SIGNIFICANT CHANGE UP
OPIATES UR-MCNC: NEGATIVE — SIGNIFICANT CHANGE UP
PCP SPEC-MCNC: SIGNIFICANT CHANGE UP
PROPOXYPHENE QUALITATIVE URINE RESULT: NEGATIVE — SIGNIFICANT CHANGE UP
RSV RNA NPH QL NAA+NON-PROBE: SIGNIFICANT CHANGE UP
SARS-COV-2 RNA SPEC QL NAA+PROBE: SIGNIFICANT CHANGE UP

## 2025-01-24 PROCEDURE — 99451 NTRPROF PH1/NTRNET/EHR 5/>: CPT

## 2025-01-24 RX ORDER — LEVOCARNITINE 200 MG/ML
2 INJECTION, SOLUTION INTRAVENOUS
Qty: 12 | Refills: 0
Start: 2025-01-24 | End: 2025-01-25

## 2025-04-16 PROBLEM — F25.9 SCHIZOAFFECTIVE DISORDER, UNSPECIFIED: Chronic | Status: ACTIVE | Noted: 2025-01-23

## 2025-05-16 ENCOUNTER — OUTPATIENT (OUTPATIENT)
Dept: OUTPATIENT SERVICES | Facility: HOSPITAL | Age: 65
LOS: 1 days | End: 2025-05-16
Payer: MEDICAID

## 2025-05-16 DIAGNOSIS — Z87.81 PERSONAL HISTORY OF (HEALED) TRAUMATIC FRACTURE: Chronic | ICD-10-CM

## 2025-05-16 DIAGNOSIS — M54.50 LOW BACK PAIN, UNSPECIFIED: ICD-10-CM

## 2025-05-16 DIAGNOSIS — Z98.890 OTHER SPECIFIED POSTPROCEDURAL STATES: Chronic | ICD-10-CM

## 2025-05-16 DIAGNOSIS — Z00.8 ENCOUNTER FOR OTHER GENERAL EXAMINATION: ICD-10-CM

## 2025-05-16 DIAGNOSIS — S42.401A UNSPECIFIED FRACTURE OF LOWER END OF RIGHT HUMERUS, INITIAL ENCOUNTER FOR CLOSED FRACTURE: Chronic | ICD-10-CM

## 2025-05-16 PROCEDURE — 72148 MRI LUMBAR SPINE W/O DYE: CPT | Mod: 26

## 2025-05-16 PROCEDURE — 72110 X-RAY EXAM L-2 SPINE 4/>VWS: CPT

## 2025-05-16 PROCEDURE — 72110 X-RAY EXAM L-2 SPINE 4/>VWS: CPT | Mod: 26

## 2025-05-16 PROCEDURE — 72148 MRI LUMBAR SPINE W/O DYE: CPT

## 2025-05-17 DIAGNOSIS — M54.50 LOW BACK PAIN, UNSPECIFIED: ICD-10-CM
